# Patient Record
Sex: MALE | Race: WHITE | NOT HISPANIC OR LATINO | Employment: FULL TIME | URBAN - METROPOLITAN AREA
[De-identification: names, ages, dates, MRNs, and addresses within clinical notes are randomized per-mention and may not be internally consistent; named-entity substitution may affect disease eponyms.]

---

## 2021-12-20 ENCOUNTER — HOSPITAL ENCOUNTER (EMERGENCY)
Facility: HOSPITAL | Age: 46
Discharge: HOME | End: 2021-12-20
Attending: STUDENT IN AN ORGANIZED HEALTH CARE EDUCATION/TRAINING PROGRAM | Admitting: STUDENT IN AN ORGANIZED HEALTH CARE EDUCATION/TRAINING PROGRAM
Payer: OTHER MISCELLANEOUS

## 2021-12-20 VITALS
OXYGEN SATURATION: 97 % | DIASTOLIC BLOOD PRESSURE: 77 MMHG | HEIGHT: 77 IN | HEART RATE: 63 BPM | TEMPERATURE: 96.1 F | SYSTOLIC BLOOD PRESSURE: 134 MMHG | RESPIRATION RATE: 12 BRPM | BODY MASS INDEX: 27.75 KG/M2 | WEIGHT: 235 LBS

## 2021-12-20 DIAGNOSIS — S61.012A LACERATION OF LEFT THUMB WITHOUT FOREIGN BODY WITHOUT DAMAGE TO NAIL, INITIAL ENCOUNTER: ICD-10-CM

## 2021-12-20 PROCEDURE — 12001 RPR S/N/AX/GEN/TRNK 2.5CM/<: CPT

## 2021-12-20 PROCEDURE — 99283 EMERGENCY DEPT VISIT LOW MDM: CPT

## 2021-12-20 ASSESSMENT — MIFFLIN-ST. JEOR: SCORE: 2053.33

## 2021-12-20 NOTE — ED TRIAGE NOTES
Pt cut his left thumb with a utility knife today at 1130 am while cutting carpet. Pt reports a long laceration to his left thumb. Pts last tetanus was 10 yrs ago.

## 2021-12-20 NOTE — ED PROVIDER NOTES
"ED Provider In Triage Note  [facility] [place]  Encounter Date: Dec 20, 2021    Chief Complaint   Patient presents with     Laceration       Brief HPI:   Caden Melgoza is a 46 year old male presenting to the Emergency Department with a chief complaint of was cutting carpet with a utility knife, slipped, cut Left thumb.     Happened about 11:30 am today. Was cutting some carpet.   Has 2.5cm lac to the extensor surface of Left thumb along the prox phalanx    Brief Physical Exam:  /77   Pulse 63   Temp (!) 96.1  F (35.6  C) (Tympanic)   Resp 12   Ht 1.956 m (6' 5\")   Wt 106.6 kg (235 lb)   SpO2 97%   BMI 27.87 kg/m    General: Non-toxic appearing  HEENT: Atraumatic  Resp: No respiratory distress  Abdomen: Non-peritoneal  Skin: 2.5 cm lac to left thumb  Neuro: Alert, oriented, answers questions appropriately  Psych: Behavior appropriate    Plan Initiated in Triage:  TDap, lac repair    PIT Dispo:   Return to lobby while awaiting workup and ED bed availability    Cooper Alvarado MD on 12/20/2021 at 1:00 PM    Patient was evaluated by the Physician in Triage due to a limitation of available rooms in the Emergency Department. A plan of care was discussed based on the information obtained on the initial evaluation and patient was consuled to return back to the Emergency Department lobby after this initial evalutaiton until results were obtained or a room became available in the Emergency Department. Patient was counseled not to leave prior to receiving the results of their workup.      Cooper Alvarado MD  12/20/21 1307    "

## 2021-12-20 NOTE — ED PROVIDER NOTES
EMERGENCY DEPARTMENT ENCOUNTER       ED Course & Medical Decision Making     Final Impression  46 year old male presents for evaluation of laceration to the left thumb.  Was cutting some carpet at work with utility knife at about 11:30 AM today, accidentally cut the extensor portion of his left thumb along the proximal phalanx.  No obvious tendon damage, no tendon deficit or strength deficits on exam.  Laceration repaired as documented below.  Will need to return for suture removal in about 10 days.  Last tetanus shot was about 10 years ago, will order tetanus update.    Prior to making a final disposition on this patient the results of patient's tests and other diagnostic studies were discussed with the patient. All questions were answered. Patient expressed understanding of the plan and was amenable to it.    Medications   Tdap (tetanus-diphtheria-acell pertussis) (ADACEL) injection 0.5 mL (has no administration in time range)       Final Impression     1. Laceration of left thumb without foreign body without damage to nail, initial encounter        Procedures     PROCEDURE: Laceration Repair   INDICATIONS: Laceration   PROCEDURE PROVIDER: Dr Cooper Alvarado   SITE: Left thumb, prox phalanx   TYPE/SIZE: subcutaneous, clean and no foreign body visualized  2.5 cm (total length)   FUNCTIONAL ASSESSMENT: Distal sensation, circulation, motor and tendon function intact   MEDICATION: 6 mLs of 2% Lidocaine with epinephrine   PREPARATION: irrigation with Normal saline   DEBRIDEMENT: no debridement   CLOSURE:  Wound was closed in   one layer: Skin closed with 7 stitches of 4-0 Ethilon    Total number of sutures/staples placed: 7     Chief Complaint     Chief Complaint   Patient presents with     Laceration     Pt cut his left thumb with a utility knife today at 1130 am while cutting carpet. Pt reports a long laceration to his left thumb. Pts last tetanus was 10 yrs ago.      HPI     Caden Melgoza is a 46 year old male who  "presents for evaluation of laceration to his left thumb.  Occurred just prior to arrival while cutting some carpet at work.  Patient is right-handed, denies any weakness or deficit in his left thumb, able to fully extend and flex, reports normal sensation in the fingertip.    Past Medical History     Denies relevant PMH  Denies relevant PSH  Denies relevant family history   Social History     Tobacco Use     Smoking status: Not on file     Smokeless tobacco: Not on file   Substance Use Topics     Alcohol use: Not on file     Drug use: Not on file     No Known Allergies    Relevant past medical, surgical, family and social history as documented above, has been reviewed and discussed with patient. No changes or additions, unless otherwise noted in the HPI.    Current Medications     No current outpatient medications on file.    Review of Systems     Musculoskeletal: Endorses laceration to the back of left thumb. Denies weakness or sensory changes in the thumb    Remainder of systems reviewed, unless noted in HPI all others negative.    Physical Exam     /77   Pulse 63   Temp (!) 96.1  F (35.6  C) (Tympanic)   Resp 12   Ht 1.956 m (6' 5\")   Wt 106.6 kg (235 lb)   SpO2 97%   BMI 27.87 kg/m    Constitutional: Awake, alert, in no acute distress  Head: Normocephalic, atraumatic.  ENT: Mucous membranes moist.   Eyes: PERRL,  Conjunctiva normal  Respiratory: Respirations even, unlabored.   Cardiovascular: Regular rate and rhythm.   Musculoskeletal: Moves all 4 extremities equally, strength symmetrical on bilateral uppers and lowers. 2.5cm laceration to the extensor aspect of left thumb, across the proximal phalanx. Able to flex and extend the thumb without deficit or limitation. Endorses normal sensation in the tip. No arterial bleeding or spurting of blood.   Neurologic: Alert & oriented x 3. Normal speech. Grossly normal motor and sensory function. No focal deficits noted.  Psychiatric: Normal mood and affect. " Normal judgement.         Cooper Alvarado MD  12/20/21 9363

## 2022-12-28 ENCOUNTER — APPOINTMENT (OUTPATIENT)
Dept: RADIOLOGY | Facility: HOSPITAL | Age: 47
End: 2022-12-28
Attending: EMERGENCY MEDICINE
Payer: COMMERCIAL

## 2022-12-28 ENCOUNTER — HOSPITAL ENCOUNTER (EMERGENCY)
Facility: HOSPITAL | Age: 47
Discharge: HOME | End: 2022-12-29
Attending: EMERGENCY MEDICINE | Admitting: EMERGENCY MEDICINE
Payer: COMMERCIAL

## 2022-12-28 VITALS
RESPIRATION RATE: 18 BRPM | SYSTOLIC BLOOD PRESSURE: 134 MMHG | TEMPERATURE: 99.7 F | WEIGHT: 250 LBS | OXYGEN SATURATION: 96 % | HEIGHT: 77 IN | HEART RATE: 65 BPM | DIASTOLIC BLOOD PRESSURE: 68 MMHG | BODY MASS INDEX: 29.52 KG/M2

## 2022-12-28 DIAGNOSIS — S89.92XA INJURY OF LEFT KNEE, INITIAL ENCOUNTER: ICD-10-CM

## 2022-12-28 PROCEDURE — 29505 APPLICATION LONG LEG SPLINT: CPT | Mod: LT

## 2022-12-28 PROCEDURE — 90715 TDAP VACCINE 7 YRS/> IM: CPT | Performed by: EMERGENCY MEDICINE

## 2022-12-28 PROCEDURE — 250N000013 HC RX MED GY IP 250 OP 250 PS 637: Performed by: EMERGENCY MEDICINE

## 2022-12-28 PROCEDURE — 99284 EMERGENCY DEPT VISIT MOD MDM: CPT | Mod: 25

## 2022-12-28 PROCEDURE — 73630 X-RAY EXAM OF FOOT: CPT | Mod: LT

## 2022-12-28 PROCEDURE — 250N000011 HC RX IP 250 OP 636: Performed by: EMERGENCY MEDICINE

## 2022-12-28 PROCEDURE — 90471 IMMUNIZATION ADMIN: CPT | Performed by: EMERGENCY MEDICINE

## 2022-12-28 PROCEDURE — 73560 X-RAY EXAM OF KNEE 1 OR 2: CPT | Mod: LT

## 2022-12-28 RX ORDER — IBUPROFEN 800 MG/1
800 TABLET, FILM COATED ORAL EVERY 8 HOURS PRN
Qty: 60 TABLET | Refills: 0 | Status: SHIPPED | OUTPATIENT
Start: 2022-12-28 | End: 2023-01-05

## 2022-12-28 RX ORDER — OXYCODONE HYDROCHLORIDE 5 MG/1
5 TABLET ORAL ONCE
Status: COMPLETED | OUTPATIENT
Start: 2022-12-29 | End: 2022-12-28

## 2022-12-28 RX ORDER — OXYCODONE HYDROCHLORIDE 5 MG/1
5 TABLET ORAL EVERY 6 HOURS PRN
Qty: 12 TABLET | Refills: 0 | Status: SHIPPED | OUTPATIENT
Start: 2022-12-28 | End: 2022-12-31

## 2022-12-28 RX ADMIN — CLOSTRIDIUM TETANI TOXOID ANTIGEN (FORMALDEHYDE INACTIVATED), CORYNEBACTERIUM DIPHTHERIAE TOXOID ANTIGEN (FORMALDEHYDE INACTIVATED), BORDETELLA PERTUSSIS TOXOID ANTIGEN (GLUTARALDEHYDE INACTIVATED), BORDETELLA PERTUSSIS FILAMENTOUS HEMAGGLUTININ ANTIGEN (FORMALDEHYDE INACTIVATED), BORDETELLA PERTUSSIS PERTACTIN ANTIGEN, AND BORDETELLA PERTUSSIS FIMBRIAE 2/3 ANTIGEN 0.5 ML: 5; 2; 2.5; 5; 3; 5 INJECTION, SUSPENSION INTRAMUSCULAR at 23:56

## 2022-12-28 RX ADMIN — OXYCODONE HYDROCHLORIDE 5 MG: 5 TABLET ORAL at 23:50

## 2022-12-28 ASSESSMENT — ENCOUNTER SYMPTOMS
HEADACHES: 0
WOUND: 1
NECK STIFFNESS: 0
ABDOMINAL PAIN: 0
CONFUSION: 0
EYE REDNESS: 0
COLOR CHANGE: 0
FEVER: 0
ARTHRALGIAS: 0
DIFFICULTY URINATING: 0
SHORTNESS OF BREATH: 0

## 2022-12-28 ASSESSMENT — ACTIVITIES OF DAILY LIVING (ADL): ADLS_ACUITY_SCORE: 35

## 2022-12-29 NOTE — DISCHARGE INSTRUCTIONS
As we discussed, with the popping noise and the location of pain it is possible that you have an injury to your medial meniscus.  It is good that your x-ray is negative for fracture today but as we discussed use the knee immobilizer and crutches to not put any weight on your leg.  First thing tomorrow, call the number above to schedule an appointment with orthopedics where they will likely do an MRI if this continues.  In the meantime, use the medications prescribed to help with discomfort, keep it elevated up on a pillow to help with swelling, and use ice frequently.

## 2022-12-29 NOTE — ED TRIAGE NOTES
Pt arriving via EMS from home, slipped down the step in his garage. Lacerations on top of left foot. Left knee is swollen. 100mcg of fentanyl given in route. Denies hitting head.

## 2022-12-29 NOTE — ED PROVIDER NOTES
EMERGENCY DEPARTMENT ENCOUNTER     NAME: Caden Melgoza   AGE: 47 year old male   YOB: 1975   MRN: [id number]   EVALUATION DATE & TIME: 12/28/2022 10:12 PM   PCP: Ferny Vega     Chief Complaint   Patient presents with     Fall   :    FINAL IMPRESSION       1. Injury of left knee, initial encounter           ED COURSE & MEDICAL DECISION MAKING      Pertinent Labs & Imaging studies reviewed. (See chart for details)   10:20 PM 47 year old male  presents to the Emergency Department for evaluation of a mechanical fall with left knee pain. Initial Vitals Reviewed. Initial exam notable for generally well-appearing male who has no laxity or deformity of the knee but who has tenderness over the inferior medial joint line.  He does also have what appears to be gout of his left first great toe which he states he was previously diagnosed with and a small abrasion overlying drip.  I did consider something like a tibial plateau fracture and did an x-ray which is negative.  I am also suspicious with the popping noise that he describes and the location of something like a medial meniscus injury.  I treated with some additional oral pain medication here, updated his tetanus as the last one was in 2011, and also did an x-ray of the foot to rule out a concurrent injury which was fortunately negative.  At this time I will immobilize in a knee immobilizer and crutches and make him nonweightbearing and have him follow-up with orthopedics for further imaging.  He is comfortable with this plan at time of discharge.  Oxycodone prescribed for breakthrough pain if Tylenol and ibuprofen are not sufficient.           At the conclusion of the encounter I discussed the results of all of the tests and the disposition. The questions were answered. The patient or family acknowledged understanding and was agreeable with the care plan.     0 minutes critical care time, see procedure note below for details if  "relevant    MEDICATIONS GIVEN IN THE EMERGENCY:   Medications   Tdap (tetanus-diphtheria-acell pertussis) (ADACEL) injection 0.5 mL (has no administration in time range)      NEW PRESCRIPTIONS STARTED AT TODAY'S ER VISIT   New Prescriptions    No medications on file     ================================================================   HISTORY OF PRESENT ILLNESS       Patient information was obtained from: patient   Use of Intrepreter: N/A    Caden Melgoza is a 47 year old male with history of arthritis, gout, and obstructive sleep apena who presents to the emergency department via EMS for evaluation of fall.    Patient was going down the steps in his garage when he slipped down the steps and banged his left knee on the ground (only has 2 steps of stairs). He sustained a laceration/abraision on the top of his left foot and left knee pain and swelling. Wife noted that his left foot \"turned purple\", which prompted them to call EMS en route to the ED. Currently, the knee and foot is wrapped with towels and gauze pads. He has a history of gout, which he endorses his gout is flaring up. Denies any other symptoms. His last tdap was in 2011.     ================================================================    REVIEW OF SYSTEMS       Review of Systems   Constitutional: Negative for fever.   HENT: Negative for congestion.    Eyes: Negative for redness.   Respiratory: Negative for shortness of breath.    Cardiovascular: Negative for chest pain.   Gastrointestinal: Negative for abdominal pain.   Genitourinary: Negative for difficulty urinating.   Musculoskeletal: Negative for arthralgias and neck stiffness.        Positive for left knee pain and swelling   Skin: Positive for wound (laceration/abrasion at the top of left foot). Negative for color change.   Neurological: Negative for headaches.   Psychiatric/Behavioral: Negative for confusion.   All other systems reviewed and are negative.        PAST HISTORY     PAST " "MEDICAL HISTORY:   History reviewed. No pertinent past medical history.   PAST SURGICAL HISTORY:   History reviewed. No pertinent surgical history.   CURRENT MEDICATIONS:   No current outpatient medications on file.    ALLERGIES:   No Known Allergies   FAMILY HISTORY:   No family history on file.   SOCIAL HISTORY:   Social History     Socioeconomic History     Marital status:         VITALS  Patient Vitals for the past 24 hrs:   BP Temp Temp src Pulse Resp SpO2 Height Weight   12/28/22 2219 (!) 153/78 99.7  F (37.6  C) Oral 69 18 95 % 1.956 m (6' 5\") 113.4 kg (250 lb)        ================================================================    PHYSICAL EXAM     VITAL SIGNS: BP (!) 153/78   Pulse 69   Temp 99.7  F (37.6  C) (Oral)   Resp 18   Ht 1.956 m (6' 5\")   Wt 113.4 kg (250 lb)   SpO2 95%   BMI 29.65 kg/m     Constitutional:  Awake, no acute distress   HENT:  Atraumatic, oropharynx without exudate or erythema, membranes moist  Lymph:  No adenopathy  Eyes: EOM intact, PERRL, no injection  Neck: Supple  Respiratory:  Clear to auscultation bilaterally, no wheezes or crackles   Cardiovascular:  Regular rate and rhythm, single S1 and S2   GI:  Soft, nontender, nondistended, no rebound or guarding   Musculoskeletal:  Moves all extremities, no lower extremity edema, no deformities    Skin:  Warm, dry  Neurologic:  Alert and oriented x3, no focal deficits noted       ================================================================  LAB       All pertinent labs reviewed and interpreted.   Labs Ordered and Resulted from Time of ED Arrival to Time of ED Departure - No data to display     ===============================================================  RADIOLOGY       Reviewed all pertinent imaging. Please see official radiology report.   Foot XR, G/E 3 views, left   Final Result   IMPRESSION: No fracture or dislocation. Moderate degenerative changes left first MTP joint. Mild soft tissue swelling in the left " first MTP joint.      XR Knee Left 1/2 Views   Final Result   IMPRESSION:       No evidence of acute fracture or dislocation.      Mild tricompartment osteoarthrosis of the knee. No significant knee joint effusion.      Mild prepatellar soft tissue swelling.            ================================================================  EKG         I have independently reviewed and interpreted the EKG(s) documented above.     ================================================================  PROCEDURES         I, Allie Lylesriley GallagherBeth, am serving as a scribe to document services personally performed by Dr. Herrera based on my observation and the provider's statements to me. I, Dana Herrera MD attest that Allie Campos is acting in a scribe capacity, has observed my performance of the services and has documented them in accordance with my direction.   Dana Herrera M.D.   Emergency Medicine   [facility] EMERGENCY DEPARTMENT  [address] [place] [id number]  [phone]  Dept: [phone]        Dana Herrera MD  12/28/22 5581

## 2022-12-29 NOTE — ED NOTES
Bed: JNED-35  Expected date: 12/28/22  Expected time: 10:03 PM  Means of arrival: Ambulance  Comments:  47M Juan Segal

## 2023-10-18 ENCOUNTER — HOSPITAL ENCOUNTER (INPATIENT)
Facility: HOSPITAL | Age: 48
LOS: 4 days | Discharge: HOME | DRG: 513 | End: 2023-10-22
Attending: EMERGENCY MEDICINE | Admitting: INTERNAL MEDICINE
Payer: OTHER MISCELLANEOUS

## 2023-10-18 ENCOUNTER — APPOINTMENT (OUTPATIENT)
Dept: RADIOLOGY | Facility: HOSPITAL | Age: 48
DRG: 513 | End: 2023-10-18
Attending: EMERGENCY MEDICINE
Payer: OTHER MISCELLANEOUS

## 2023-10-18 ENCOUNTER — ANESTHESIA EVENT (OUTPATIENT)
Dept: SURGERY | Facility: HOSPITAL | Age: 48
DRG: 513 | End: 2023-10-18
Payer: OTHER MISCELLANEOUS

## 2023-10-18 ENCOUNTER — ANESTHESIA (OUTPATIENT)
Dept: SURGERY | Facility: HOSPITAL | Age: 48
DRG: 513 | End: 2023-10-18
Payer: OTHER MISCELLANEOUS

## 2023-10-18 DIAGNOSIS — M00.042: Primary | ICD-10-CM

## 2023-10-18 DIAGNOSIS — S61.412A LACERATION OF LEFT HAND WITH INFECTION, INITIAL ENCOUNTER: ICD-10-CM

## 2023-10-18 DIAGNOSIS — L08.9 LACERATION OF LEFT HAND WITH INFECTION, INITIAL ENCOUNTER: ICD-10-CM

## 2023-10-18 PROBLEM — G47.33 OSA (OBSTRUCTIVE SLEEP APNEA): Status: ACTIVE | Noted: 2023-10-18

## 2023-10-18 PROBLEM — S61.402A EXTENSOR TENDON LACERATION OF LEFT HAND WITH OPEN WOUND: Status: ACTIVE | Noted: 2023-10-18

## 2023-10-18 PROBLEM — K21.00 GASTROESOPHAGEAL REFLUX DISEASE WITH ESOPHAGITIS: Status: ACTIVE | Noted: 2023-10-18

## 2023-10-18 PROBLEM — S66.822A EXTENSOR TENDON LACERATION OF LEFT HAND WITH OPEN WOUND: Status: ACTIVE | Noted: 2023-10-18

## 2023-10-18 PROBLEM — L03.012 CELLULITIS OF FINGER OF LEFT HAND: Status: ACTIVE | Noted: 2023-10-18

## 2023-10-18 PROBLEM — M10.9 GOUT: Status: ACTIVE | Noted: 2023-10-18

## 2023-10-18 LAB
ALBUMIN SERPL BCG-MCNC: 4.4 G/DL (ref 3.5–5.2)
ALP SERPL-CCNC: 93 U/L (ref 40–129)
ALT SERPL W P-5'-P-CCNC: 19 U/L (ref 0–70)
ANION GAP SERPL CALCULATED.3IONS-SCNC: 11 MMOL/L (ref 7–15)
ANION GAP SERPL CALCULATED.3IONS-SCNC: 11 MMOL/L (ref 7–15)
AST SERPL W P-5'-P-CCNC: 19 U/L (ref 0–45)
BASO+EOS+MONOS # BLD AUTO: ABNORMAL 10*3/UL
BASO+EOS+MONOS # BLD AUTO: ABNORMAL 10*3/UL
BASO+EOS+MONOS NFR BLD AUTO: ABNORMAL %
BASO+EOS+MONOS NFR BLD AUTO: ABNORMAL %
BASOPHILS # BLD AUTO: 0 10E3/UL (ref 0–0.2)
BASOPHILS # BLD AUTO: 0.1 10E3/UL (ref 0–0.2)
BASOPHILS NFR BLD AUTO: 0 %
BASOPHILS NFR BLD AUTO: 0 %
BILIRUB SERPL-MCNC: 0.4 MG/DL
BUN SERPL-MCNC: 16.4 MG/DL (ref 6–20)
BUN SERPL-MCNC: 18 MG/DL (ref 6–20)
CALCIUM SERPL-MCNC: 9.2 MG/DL (ref 8.6–10)
CALCIUM SERPL-MCNC: 9.3 MG/DL (ref 8.6–10)
CHLORIDE SERPL-SCNC: 100 MMOL/L (ref 98–107)
CHLORIDE SERPL-SCNC: 105 MMOL/L (ref 98–107)
CREAT SERPL-MCNC: 0.78 MG/DL (ref 0.67–1.17)
CREAT SERPL-MCNC: 0.86 MG/DL (ref 0.67–1.17)
CRP SERPL-MCNC: 56.4 MG/L
DEPRECATED HCO3 PLAS-SCNC: 22 MMOL/L (ref 22–29)
DEPRECATED HCO3 PLAS-SCNC: 24 MMOL/L (ref 22–29)
EGFRCR SERPLBLD CKD-EPI 2021: >90 ML/MIN/1.73M2
EGFRCR SERPLBLD CKD-EPI 2021: >90 ML/MIN/1.73M2
EOSINOPHIL # BLD AUTO: 0 10E3/UL (ref 0–0.7)
EOSINOPHIL # BLD AUTO: 0.4 10E3/UL (ref 0–0.7)
EOSINOPHIL NFR BLD AUTO: 0 %
EOSINOPHIL NFR BLD AUTO: 3 %
ERYTHROCYTE [DISTWIDTH] IN BLOOD BY AUTOMATED COUNT: 12.5 % (ref 10–15)
ERYTHROCYTE [DISTWIDTH] IN BLOOD BY AUTOMATED COUNT: 12.6 % (ref 10–15)
GLUCOSE SERPL-MCNC: 118 MG/DL (ref 70–99)
GLUCOSE SERPL-MCNC: 158 MG/DL (ref 70–99)
HCT VFR BLD AUTO: 38.6 % (ref 40–53)
HCT VFR BLD AUTO: 41.1 % (ref 40–53)
HGB BLD-MCNC: 12.7 G/DL (ref 13.3–17.7)
HGB BLD-MCNC: 13.9 G/DL (ref 13.3–17.7)
IMM GRANULOCYTES # BLD: 0 10E3/UL
IMM GRANULOCYTES # BLD: 0.1 10E3/UL
IMM GRANULOCYTES NFR BLD: 0 %
IMM GRANULOCYTES NFR BLD: 0 %
LACTATE SERPL-SCNC: 0.9 MMOL/L (ref 0.7–2)
LYMPHOCYTES # BLD AUTO: 1.1 10E3/UL (ref 0.8–5.3)
LYMPHOCYTES # BLD AUTO: 2.3 10E3/UL (ref 0.8–5.3)
LYMPHOCYTES NFR BLD AUTO: 18 %
LYMPHOCYTES NFR BLD AUTO: 9 %
MCH RBC QN AUTO: 30.2 PG (ref 26.5–33)
MCH RBC QN AUTO: 30.5 PG (ref 26.5–33)
MCHC RBC AUTO-ENTMCNC: 32.9 G/DL (ref 31.5–36.5)
MCHC RBC AUTO-ENTMCNC: 33.8 G/DL (ref 31.5–36.5)
MCV RBC AUTO: 90 FL (ref 78–100)
MCV RBC AUTO: 92 FL (ref 78–100)
MONOCYTES # BLD AUTO: 0.6 10E3/UL (ref 0–1.3)
MONOCYTES # BLD AUTO: 1 10E3/UL (ref 0–1.3)
MONOCYTES NFR BLD AUTO: 5 %
MONOCYTES NFR BLD AUTO: 8 %
MRSA DNA SPEC QL NAA+PROBE: NEGATIVE
NEUTROPHILS # BLD AUTO: 9 10E3/UL (ref 1.6–8.3)
NEUTROPHILS # BLD AUTO: 9.7 10E3/UL (ref 1.6–8.3)
NEUTROPHILS NFR BLD AUTO: 71 %
NEUTROPHILS NFR BLD AUTO: 86 %
NRBC # BLD AUTO: 0 10E3/UL
NRBC # BLD AUTO: 0 10E3/UL
NRBC BLD AUTO-RTO: 0 /100
NRBC BLD AUTO-RTO: 0 /100
PLATELET # BLD AUTO: 222 10E3/UL (ref 150–450)
PLATELET # BLD AUTO: 250 10E3/UL (ref 150–450)
POTASSIUM SERPL-SCNC: 4.3 MMOL/L (ref 3.4–5.3)
POTASSIUM SERPL-SCNC: 4.4 MMOL/L (ref 3.4–5.3)
PROT SERPL-MCNC: 6.8 G/DL (ref 6.4–8.3)
RBC # BLD AUTO: 4.21 10E6/UL (ref 4.4–5.9)
RBC # BLD AUTO: 4.56 10E6/UL (ref 4.4–5.9)
SA TARGET DNA: POSITIVE
SODIUM SERPL-SCNC: 135 MMOL/L (ref 135–145)
SODIUM SERPL-SCNC: 138 MMOL/L (ref 135–145)
WBC # BLD AUTO: 11.5 10E3/UL (ref 4–11)
WBC # BLD AUTO: 12.8 10E3/UL (ref 4–11)

## 2023-10-18 PROCEDURE — 90471 IMMUNIZATION ADMIN: CPT | Performed by: INTERNAL MEDICINE

## 2023-10-18 PROCEDURE — 87101 SKIN FUNGI CULTURE: CPT | Performed by: ORTHOPAEDIC SURGERY

## 2023-10-18 PROCEDURE — 258N000003 HC RX IP 258 OP 636: Performed by: EMERGENCY MEDICINE

## 2023-10-18 PROCEDURE — 258N000003 HC RX IP 258 OP 636: Performed by: PHYSICIAN ASSISTANT

## 2023-10-18 PROCEDURE — 999N000141 HC STATISTIC PRE-PROCEDURE NURSING ASSESSMENT: Performed by: ORTHOPAEDIC SURGERY

## 2023-10-18 PROCEDURE — 87147 CULTURE TYPE IMMUNOLOGIC: CPT | Performed by: ORTHOPAEDIC SURGERY

## 2023-10-18 PROCEDURE — 99418 PROLNG IP/OBS E/M EA 15 MIN: CPT | Performed by: INTERNAL MEDICINE

## 2023-10-18 PROCEDURE — 90715 TDAP VACCINE 7 YRS/> IM: CPT | Performed by: INTERNAL MEDICINE

## 2023-10-18 PROCEDURE — 250N000013 HC RX MED GY IP 250 OP 250 PS 637: Performed by: INTERNAL MEDICINE

## 2023-10-18 PROCEDURE — 86140 C-REACTIVE PROTEIN: CPT | Performed by: EMERGENCY MEDICINE

## 2023-10-18 PROCEDURE — 250N000011 HC RX IP 250 OP 636: Performed by: INTERNAL MEDICINE

## 2023-10-18 PROCEDURE — 250N000013 HC RX MED GY IP 250 OP 250 PS 637: Performed by: PHYSICIAN ASSISTANT

## 2023-10-18 PROCEDURE — 250N000011 HC RX IP 250 OP 636: Performed by: EMERGENCY MEDICINE

## 2023-10-18 PROCEDURE — 258N000003 HC RX IP 258 OP 636: Performed by: ANESTHESIOLOGY

## 2023-10-18 PROCEDURE — 250N000011 HC RX IP 250 OP 636: Mod: JZ | Performed by: PHYSICIAN ASSISTANT

## 2023-10-18 PROCEDURE — 36415 COLL VENOUS BLD VENIPUNCTURE: CPT | Performed by: INTERNAL MEDICINE

## 2023-10-18 PROCEDURE — 96365 THER/PROPH/DIAG IV INF INIT: CPT

## 2023-10-18 PROCEDURE — 96376 TX/PRO/DX INJ SAME DRUG ADON: CPT

## 2023-10-18 PROCEDURE — 99223 1ST HOSP IP/OBS HIGH 75: CPT | Mod: FS | Performed by: INTERNAL MEDICINE

## 2023-10-18 PROCEDURE — 250N000011 HC RX IP 250 OP 636: Mod: JZ | Performed by: INTERNAL MEDICINE

## 2023-10-18 PROCEDURE — 99285 EMERGENCY DEPT VISIT HI MDM: CPT | Mod: 25

## 2023-10-18 PROCEDURE — 99207 PR APP CREDIT; MD BILLING SHARED VISIT: CPT | Performed by: INTERNAL MEDICINE

## 2023-10-18 PROCEDURE — 87040 BLOOD CULTURE FOR BACTERIA: CPT | Performed by: EMERGENCY MEDICINE

## 2023-10-18 PROCEDURE — 82040 ASSAY OF SERUM ALBUMIN: CPT | Performed by: INTERNAL MEDICINE

## 2023-10-18 PROCEDURE — 250N000009 HC RX 250: Performed by: ORTHOPAEDIC SURGERY

## 2023-10-18 PROCEDURE — 85025 COMPLETE CBC W/AUTO DIFF WBC: CPT | Performed by: INTERNAL MEDICINE

## 2023-10-18 PROCEDURE — 250N000011 HC RX IP 250 OP 636: Mod: JZ | Performed by: NURSE ANESTHETIST, CERTIFIED REGISTERED

## 2023-10-18 PROCEDURE — 250N000011 HC RX IP 250 OP 636: Performed by: NURSE ANESTHETIST, CERTIFIED REGISTERED

## 2023-10-18 PROCEDURE — 87641 MR-STAPH DNA AMP PROBE: CPT | Performed by: INTERNAL MEDICINE

## 2023-10-18 PROCEDURE — 360N000075 HC SURGERY LEVEL 2, PER MIN: Performed by: ORTHOPAEDIC SURGERY

## 2023-10-18 PROCEDURE — 36415 COLL VENOUS BLD VENIPUNCTURE: CPT | Performed by: EMERGENCY MEDICINE

## 2023-10-18 PROCEDURE — 370N000017 HC ANESTHESIA TECHNICAL FEE, PER MIN: Performed by: ORTHOPAEDIC SURGERY

## 2023-10-18 PROCEDURE — 258N000003 HC RX IP 258 OP 636: Performed by: INTERNAL MEDICINE

## 2023-10-18 PROCEDURE — 87205 SMEAR GRAM STAIN: CPT | Performed by: ORTHOPAEDIC SURGERY

## 2023-10-18 PROCEDURE — 272N000001 HC OR GENERAL SUPPLY STERILE: Performed by: ORTHOPAEDIC SURGERY

## 2023-10-18 PROCEDURE — 85025 COMPLETE CBC W/AUTO DIFF WBC: CPT | Performed by: EMERGENCY MEDICINE

## 2023-10-18 PROCEDURE — 120N000001 HC R&B MED SURG/OB

## 2023-10-18 PROCEDURE — 250N000013 HC RX MED GY IP 250 OP 250 PS 637: Performed by: EMERGENCY MEDICINE

## 2023-10-18 PROCEDURE — 0LD80ZZ EXTRACTION OF LEFT HAND TENDON, OPEN APPROACH: ICD-10-PCS | Performed by: ORTHOPAEDIC SURGERY

## 2023-10-18 PROCEDURE — 96368 THER/DIAG CONCURRENT INF: CPT

## 2023-10-18 PROCEDURE — 73140 X-RAY EXAM OF FINGER(S): CPT | Mod: LT

## 2023-10-18 PROCEDURE — 80053 COMPREHEN METABOLIC PANEL: CPT | Performed by: EMERGENCY MEDICINE

## 2023-10-18 PROCEDURE — 87077 CULTURE AEROBIC IDENTIFY: CPT | Performed by: ORTHOPAEDIC SURGERY

## 2023-10-18 PROCEDURE — 87075 CULTR BACTERIA EXCEPT BLOOD: CPT | Performed by: ORTHOPAEDIC SURGERY

## 2023-10-18 PROCEDURE — 83605 ASSAY OF LACTIC ACID: CPT | Performed by: EMERGENCY MEDICINE

## 2023-10-18 RX ORDER — PROCHLORPERAZINE MALEATE 10 MG
10 TABLET ORAL EVERY 6 HOURS PRN
Status: DISCONTINUED | OUTPATIENT
Start: 2023-10-18 | End: 2023-10-22 | Stop reason: HOSPADM

## 2023-10-18 RX ORDER — LIDOCAINE 40 MG/G
CREAM TOPICAL
Status: DISCONTINUED | OUTPATIENT
Start: 2023-10-18 | End: 2023-10-22 | Stop reason: HOSPADM

## 2023-10-18 RX ORDER — NALOXONE HYDROCHLORIDE 0.4 MG/ML
0.4 INJECTION, SOLUTION INTRAMUSCULAR; INTRAVENOUS; SUBCUTANEOUS
Status: DISCONTINUED | OUTPATIENT
Start: 2023-10-18 | End: 2023-10-22 | Stop reason: HOSPADM

## 2023-10-18 RX ORDER — ACETAMINOPHEN 325 MG/1
975 TABLET ORAL EVERY 8 HOURS
Status: COMPLETED | OUTPATIENT
Start: 2023-10-18 | End: 2023-10-21

## 2023-10-18 RX ORDER — POLYETHYLENE GLYCOL 3350 17 G/17G
17 POWDER, FOR SOLUTION ORAL DAILY
Status: DISCONTINUED | OUTPATIENT
Start: 2023-10-19 | End: 2023-10-22 | Stop reason: HOSPADM

## 2023-10-18 RX ORDER — ONDANSETRON 2 MG/ML
INJECTION INTRAMUSCULAR; INTRAVENOUS PRN
Status: DISCONTINUED | OUTPATIENT
Start: 2023-10-18 | End: 2023-10-18

## 2023-10-18 RX ORDER — CEFAZOLIN SODIUM 1 G/50ML
2500 SOLUTION INTRAVENOUS EVERY 12 HOURS
Status: CANCELLED | OUTPATIENT
Start: 2023-10-18

## 2023-10-18 RX ORDER — GINSENG 100 MG
CAPSULE ORAL PRN
Status: DISCONTINUED | OUTPATIENT
Start: 2023-10-18 | End: 2023-10-18 | Stop reason: HOSPADM

## 2023-10-18 RX ORDER — FENTANYL CITRATE 50 UG/ML
25 INJECTION, SOLUTION INTRAMUSCULAR; INTRAVENOUS
Status: DISCONTINUED | OUTPATIENT
Start: 2023-10-18 | End: 2023-10-18 | Stop reason: HOSPADM

## 2023-10-18 RX ORDER — ONDANSETRON 4 MG/1
4 TABLET, ORALLY DISINTEGRATING ORAL EVERY 6 HOURS PRN
Status: DISCONTINUED | OUTPATIENT
Start: 2023-10-18 | End: 2023-10-22 | Stop reason: HOSPADM

## 2023-10-18 RX ORDER — PROPOFOL 10 MG/ML
INJECTION, EMULSION INTRAVENOUS CONTINUOUS PRN
Status: DISCONTINUED | OUTPATIENT
Start: 2023-10-18 | End: 2023-10-18

## 2023-10-18 RX ORDER — ONDANSETRON 2 MG/ML
4 INJECTION INTRAMUSCULAR; INTRAVENOUS EVERY 6 HOURS PRN
Status: DISCONTINUED | OUTPATIENT
Start: 2023-10-18 | End: 2023-10-22 | Stop reason: HOSPADM

## 2023-10-18 RX ORDER — ONDANSETRON 4 MG/1
4 TABLET, ORALLY DISINTEGRATING ORAL EVERY 6 HOURS PRN
Status: DISCONTINUED | OUTPATIENT
Start: 2023-10-18 | End: 2023-10-19

## 2023-10-18 RX ORDER — FENTANYL CITRATE 50 UG/ML
INJECTION, SOLUTION INTRAMUSCULAR; INTRAVENOUS PRN
Status: DISCONTINUED | OUTPATIENT
Start: 2023-10-18 | End: 2023-10-18

## 2023-10-18 RX ORDER — NALOXONE HYDROCHLORIDE 0.4 MG/ML
0.2 INJECTION, SOLUTION INTRAMUSCULAR; INTRAVENOUS; SUBCUTANEOUS
Status: DISCONTINUED | OUTPATIENT
Start: 2023-10-18 | End: 2023-10-22 | Stop reason: HOSPADM

## 2023-10-18 RX ORDER — CEFAZOLIN SODIUM 1 G/50ML
1250 SOLUTION INTRAVENOUS EVERY 12 HOURS
Status: DISCONTINUED | OUTPATIENT
Start: 2023-10-18 | End: 2023-10-19

## 2023-10-18 RX ORDER — PROPOFOL 10 MG/ML
INJECTION, EMULSION INTRAVENOUS PRN
Status: DISCONTINUED | OUTPATIENT
Start: 2023-10-18 | End: 2023-10-18

## 2023-10-18 RX ORDER — OXYCODONE HYDROCHLORIDE 5 MG/1
5 TABLET ORAL EVERY 4 HOURS PRN
Status: DISCONTINUED | OUTPATIENT
Start: 2023-10-18 | End: 2023-10-22 | Stop reason: HOSPADM

## 2023-10-18 RX ORDER — HYDROMORPHONE HYDROCHLORIDE 1 MG/ML
0.5 INJECTION, SOLUTION INTRAMUSCULAR; INTRAVENOUS; SUBCUTANEOUS EVERY 4 HOURS PRN
Status: DISCONTINUED | OUTPATIENT
Start: 2023-10-18 | End: 2023-10-19

## 2023-10-18 RX ORDER — BISACODYL 10 MG
10 SUPPOSITORY, RECTAL RECTAL DAILY PRN
Status: DISCONTINUED | OUTPATIENT
Start: 2023-10-18 | End: 2023-10-22 | Stop reason: HOSPADM

## 2023-10-18 RX ORDER — LORATADINE 10 MG/1
10 TABLET ORAL DAILY
Status: DISCONTINUED | OUTPATIENT
Start: 2023-10-18 | End: 2023-10-22 | Stop reason: HOSPADM

## 2023-10-18 RX ORDER — CEFTRIAXONE 1 G/1
1 INJECTION, POWDER, FOR SOLUTION INTRAMUSCULAR; INTRAVENOUS EVERY 24 HOURS
Status: DISCONTINUED | OUTPATIENT
Start: 2023-10-18 | End: 2023-10-19

## 2023-10-18 RX ORDER — ONDANSETRON 2 MG/ML
4 INJECTION INTRAMUSCULAR; INTRAVENOUS EVERY 30 MIN PRN
Status: DISCONTINUED | OUTPATIENT
Start: 2023-10-18 | End: 2023-10-18 | Stop reason: HOSPADM

## 2023-10-18 RX ORDER — ONDANSETRON 4 MG/1
4 TABLET, ORALLY DISINTEGRATING ORAL EVERY 30 MIN PRN
Status: DISCONTINUED | OUTPATIENT
Start: 2023-10-18 | End: 2023-10-18 | Stop reason: HOSPADM

## 2023-10-18 RX ORDER — CEFAZOLIN SODIUM 1 G/50ML
1250 SOLUTION INTRAVENOUS EVERY 12 HOURS
Status: DISCONTINUED | OUTPATIENT
Start: 2023-10-18 | End: 2023-10-18

## 2023-10-18 RX ORDER — OXYCODONE HYDROCHLORIDE 5 MG/1
10 TABLET ORAL
Status: DISCONTINUED | OUTPATIENT
Start: 2023-10-18 | End: 2023-10-18 | Stop reason: HOSPADM

## 2023-10-18 RX ORDER — PANTOPRAZOLE SODIUM 40 MG/1
40 TABLET, DELAYED RELEASE ORAL
Status: DISCONTINUED | OUTPATIENT
Start: 2023-10-18 | End: 2023-10-22 | Stop reason: HOSPADM

## 2023-10-18 RX ORDER — ACETAMINOPHEN 325 MG/1
975 TABLET ORAL
Status: DISCONTINUED | OUTPATIENT
Start: 2023-10-18 | End: 2023-10-18 | Stop reason: HOSPADM

## 2023-10-18 RX ORDER — OXYCODONE AND ACETAMINOPHEN 5; 325 MG/1; MG/1
1 TABLET ORAL ONCE
Status: COMPLETED | OUTPATIENT
Start: 2023-10-18 | End: 2023-10-18

## 2023-10-18 RX ORDER — PROBENECID AND COLCHICINE .5; 5 MG/1; MG/1
1 TABLET ORAL EVERY MORNING
COMMUNITY
Start: 2023-09-11

## 2023-10-18 RX ORDER — ZINC GLUCONATE 50 MG
50 TABLET ORAL DAILY
Status: DISCONTINUED | OUTPATIENT
Start: 2023-10-19 | End: 2023-10-22 | Stop reason: HOSPADM

## 2023-10-18 RX ORDER — ALLOPURINOL 300 MG/1
150 TABLET ORAL DAILY
COMMUNITY
Start: 2023-09-11

## 2023-10-18 RX ORDER — ACETAMINOPHEN 325 MG/1
650 TABLET ORAL EVERY 4 HOURS PRN
Status: DISCONTINUED | OUTPATIENT
Start: 2023-10-21 | End: 2023-10-22 | Stop reason: HOSPADM

## 2023-10-18 RX ORDER — OXYCODONE HYDROCHLORIDE 5 MG/1
5 TABLET ORAL
Status: DISCONTINUED | OUTPATIENT
Start: 2023-10-18 | End: 2023-10-18 | Stop reason: HOSPADM

## 2023-10-18 RX ORDER — HYDROMORPHONE HCL IN WATER/PF 6 MG/30 ML
0.2 PATIENT CONTROLLED ANALGESIA SYRINGE INTRAVENOUS
Status: DISCONTINUED | OUTPATIENT
Start: 2023-10-18 | End: 2023-10-22 | Stop reason: HOSPADM

## 2023-10-18 RX ORDER — LIDOCAINE 40 MG/G
CREAM TOPICAL
Status: DISCONTINUED | OUTPATIENT
Start: 2023-10-18 | End: 2023-10-18 | Stop reason: HOSPADM

## 2023-10-18 RX ORDER — OMEPRAZOLE 20 MG/1
20 TABLET, DELAYED RELEASE ORAL DAILY
COMMUNITY

## 2023-10-18 RX ORDER — SODIUM CHLORIDE, SODIUM LACTATE, POTASSIUM CHLORIDE, CALCIUM CHLORIDE 600; 310; 30; 20 MG/100ML; MG/100ML; MG/100ML; MG/100ML
INJECTION, SOLUTION INTRAVENOUS CONTINUOUS
Status: DISCONTINUED | OUTPATIENT
Start: 2023-10-18 | End: 2023-10-18 | Stop reason: HOSPADM

## 2023-10-18 RX ORDER — CHLORAL HYDRATE 500 MG
1 CAPSULE ORAL DAILY
COMMUNITY

## 2023-10-18 RX ORDER — AMOXICILLIN 250 MG
1 CAPSULE ORAL 2 TIMES DAILY
Status: DISCONTINUED | OUTPATIENT
Start: 2023-10-18 | End: 2023-10-22 | Stop reason: HOSPADM

## 2023-10-18 RX ORDER — DOCUSATE SODIUM 100 MG/1
100 CAPSULE, LIQUID FILLED ORAL 2 TIMES DAILY
Status: DISCONTINUED | OUTPATIENT
Start: 2023-10-18 | End: 2023-10-22 | Stop reason: HOSPADM

## 2023-10-18 RX ORDER — HYDROCODONE BITARTRATE AND ACETAMINOPHEN 5; 325 MG/1; MG/1
1 TABLET ORAL EVERY 4 HOURS PRN
Status: DISCONTINUED | OUTPATIENT
Start: 2023-10-18 | End: 2023-10-22 | Stop reason: HOSPADM

## 2023-10-18 RX ORDER — SODIUM CHLORIDE, SODIUM LACTATE, POTASSIUM CHLORIDE, CALCIUM CHLORIDE 600; 310; 30; 20 MG/100ML; MG/100ML; MG/100ML; MG/100ML
INJECTION, SOLUTION INTRAVENOUS CONTINUOUS
Status: DISCONTINUED | OUTPATIENT
Start: 2023-10-18 | End: 2023-10-22 | Stop reason: HOSPADM

## 2023-10-18 RX ORDER — CEFTRIAXONE 1 G/1
1 INJECTION, POWDER, FOR SOLUTION INTRAMUSCULAR; INTRAVENOUS ONCE
Status: COMPLETED | OUTPATIENT
Start: 2023-10-18 | End: 2023-10-18

## 2023-10-18 RX ORDER — LORATADINE 10 MG/1
10 TABLET ORAL DAILY
COMMUNITY

## 2023-10-18 RX ORDER — ONDANSETRON 2 MG/ML
4 INJECTION INTRAMUSCULAR; INTRAVENOUS EVERY 6 HOURS PRN
Status: DISCONTINUED | OUTPATIENT
Start: 2023-10-18 | End: 2023-10-19

## 2023-10-18 RX ORDER — OXYCODONE HYDROCHLORIDE 5 MG/1
10 TABLET ORAL EVERY 4 HOURS PRN
Status: DISCONTINUED | OUTPATIENT
Start: 2023-10-18 | End: 2023-10-22 | Stop reason: HOSPADM

## 2023-10-18 RX ORDER — OMEPRAZOLE 20 MG/1
20 TABLET, DELAYED RELEASE ORAL DAILY
Status: DISCONTINUED | OUTPATIENT
Start: 2023-10-18 | End: 2023-10-18

## 2023-10-18 RX ORDER — IBUPROFEN 200 MG
400 TABLET ORAL ONCE
Status: COMPLETED | OUTPATIENT
Start: 2023-10-18 | End: 2023-10-18

## 2023-10-18 RX ORDER — LIDOCAINE HYDROCHLORIDE AND EPINEPHRINE 10; 10 MG/ML; UG/ML
INJECTION, SOLUTION INFILTRATION; PERINEURAL PRN
Status: DISCONTINUED | OUTPATIENT
Start: 2023-10-18 | End: 2023-10-18 | Stop reason: HOSPADM

## 2023-10-18 RX ORDER — PROBENECID AND COLCHICINE .5; 5 MG/1; MG/1
1 TABLET ORAL EVERY MORNING
Status: DISCONTINUED | OUTPATIENT
Start: 2023-10-19 | End: 2023-10-22 | Stop reason: HOSPADM

## 2023-10-18 RX ORDER — CEFAZOLIN SODIUM 2 G/100ML
2 INJECTION, SOLUTION INTRAVENOUS EVERY 8 HOURS
Qty: 200 ML | Refills: 0 | Status: COMPLETED | OUTPATIENT
Start: 2023-10-18 | End: 2023-10-19

## 2023-10-18 RX ORDER — HYDROMORPHONE HCL IN WATER/PF 6 MG/30 ML
0.4 PATIENT CONTROLLED ANALGESIA SYRINGE INTRAVENOUS
Status: DISCONTINUED | OUTPATIENT
Start: 2023-10-18 | End: 2023-10-22 | Stop reason: HOSPADM

## 2023-10-18 RX ADMIN — CEFTRIAXONE SODIUM 1 G: 1 INJECTION, POWDER, FOR SOLUTION INTRAMUSCULAR; INTRAVENOUS at 22:32

## 2023-10-18 RX ADMIN — OXYCODONE HYDROCHLORIDE AND ACETAMINOPHEN 1 TABLET: 5; 325 TABLET ORAL at 03:25

## 2023-10-18 RX ADMIN — FENTANYL CITRATE 50 MCG: 50 INJECTION INTRAMUSCULAR; INTRAVENOUS at 18:23

## 2023-10-18 RX ADMIN — ONDANSETRON 4 MG: 2 INJECTION INTRAMUSCULAR; INTRAVENOUS at 18:43

## 2023-10-18 RX ADMIN — FENTANYL CITRATE 50 MCG: 50 INJECTION INTRAMUSCULAR; INTRAVENOUS at 18:42

## 2023-10-18 RX ADMIN — CLOSTRIDIUM TETANI TOXOID ANTIGEN (FORMALDEHYDE INACTIVATED), CORYNEBACTERIUM DIPHTHERIAE TOXOID ANTIGEN (FORMALDEHYDE INACTIVATED), BORDETELLA PERTUSSIS TOXOID ANTIGEN (GLUTARALDEHYDE INACTIVATED), BORDETELLA PERTUSSIS FILAMENTOUS HEMAGGLUTININ ANTIGEN (FORMALDEHYDE INACTIVATED), BORDETELLA PERTUSSIS PERTACTIN ANTIGEN, AND BORDETELLA PERTUSSIS FIMBRIAE 2/3 ANTIGEN 0.5 ML: 5; 2; 2.5; 5; 3; 5 INJECTION, SUSPENSION INTRAMUSCULAR at 04:49

## 2023-10-18 RX ADMIN — IBUPROFEN 400 MG: 200 TABLET, FILM COATED ORAL at 03:25

## 2023-10-18 RX ADMIN — ACETAMINOPHEN 975 MG: 325 TABLET ORAL at 21:37

## 2023-10-18 RX ADMIN — CEFTRIAXONE SODIUM 1 G: 1 INJECTION, POWDER, FOR SOLUTION INTRAMUSCULAR; INTRAVENOUS at 02:30

## 2023-10-18 RX ADMIN — VANCOMYCIN HYDROCHLORIDE 2250 MG: 5 INJECTION, POWDER, LYOPHILIZED, FOR SOLUTION INTRAVENOUS at 02:09

## 2023-10-18 RX ADMIN — DOCUSATE SODIUM 100 MG: 100 CAPSULE, LIQUID FILLED ORAL at 08:33

## 2023-10-18 RX ADMIN — HYDROCODONE BITARTRATE AND ACETAMINOPHEN 1 TABLET: 5; 325 TABLET ORAL at 08:33

## 2023-10-18 RX ADMIN — SODIUM CHLORIDE, POTASSIUM CHLORIDE, SODIUM LACTATE AND CALCIUM CHLORIDE: 600; 310; 30; 20 INJECTION, SOLUTION INTRAVENOUS at 18:17

## 2023-10-18 RX ADMIN — SODIUM CHLORIDE, POTASSIUM CHLORIDE, SODIUM LACTATE AND CALCIUM CHLORIDE: 600; 310; 30; 20 INJECTION, SOLUTION INTRAVENOUS at 21:41

## 2023-10-18 RX ADMIN — DOCUSATE SODIUM 100 MG: 100 CAPSULE, LIQUID FILLED ORAL at 21:37

## 2023-10-18 RX ADMIN — MIDAZOLAM 1 MG: 1 INJECTION INTRAMUSCULAR; INTRAVENOUS at 18:17

## 2023-10-18 RX ADMIN — VANCOMYCIN HYDROCHLORIDE 1250 MG: 5 INJECTION, POWDER, LYOPHILIZED, FOR SOLUTION INTRAVENOUS at 13:24

## 2023-10-18 RX ADMIN — HYDROCODONE BITARTRATE AND ACETAMINOPHEN 1 TABLET: 5; 325 TABLET ORAL at 15:05

## 2023-10-18 RX ADMIN — MIDAZOLAM 1 MG: 1 INJECTION INTRAMUSCULAR; INTRAVENOUS at 18:23

## 2023-10-18 RX ADMIN — HYDROMORPHONE HYDROCHLORIDE 0.2 MG: 0.2 INJECTION, SOLUTION INTRAMUSCULAR; INTRAVENOUS; SUBCUTANEOUS at 22:57

## 2023-10-18 RX ADMIN — CEFAZOLIN SODIUM 2 G: 2 INJECTION, SOLUTION INTRAVENOUS at 21:41

## 2023-10-18 RX ADMIN — SENNOSIDES AND DOCUSATE SODIUM 1 TABLET: 8.6; 5 TABLET ORAL at 21:37

## 2023-10-18 RX ADMIN — PROPOFOL 200 MCG/KG/MIN: 10 INJECTION, EMULSION INTRAVENOUS at 18:23

## 2023-10-18 RX ADMIN — OXYCODONE HYDROCHLORIDE AND ACETAMINOPHEN 1 TABLET: 5; 325 TABLET ORAL at 01:47

## 2023-10-18 RX ADMIN — PROPOFOL 20 MG: 10 INJECTION, EMULSION INTRAVENOUS at 18:23

## 2023-10-18 ASSESSMENT — ACTIVITIES OF DAILY LIVING (ADL)
ADLS_ACUITY_SCORE: 35
ADLS_ACUITY_SCORE: 18
ADLS_ACUITY_SCORE: 18
ADLS_ACUITY_SCORE: 35
DEPENDENT_IADLS:: INDEPENDENT
ADLS_ACUITY_SCORE: 35

## 2023-10-18 NOTE — PLAN OF CARE
Problem: Adult Inpatient Plan of Care  Goal: Optimal Comfort and Wellbeing  Intervention: Monitor Pain and Promote Comfort  Recent Flowsheet Documentation  Taken 10/18/2023 1300 by Nydia Hogue, RN  Pain Management Interventions: declines  Taken 10/18/2023 0833 by Nydia Hogue, RN  Pain Management Interventions: medication (see MAR)     Problem: Infection  Goal: Absence of Infection Signs and Symptoms  Outcome: Progressing   Goal Outcome Evaluation:       Patient receiving IV antibiotics. Pain well controlled with hydrocodone. VSS. Resting comfortably between cares. Waiting to have surgery later this evening.    Nydia Hogue, RN

## 2023-10-18 NOTE — H&P
"[facility]    History and Physical - Hospitalist Service       Date of Admission:  10/18/2023    Assessment & Plan    Patient is a 48-year-old male with a medical history significant for gout, GERD, obstructive sleep apnea and other medical comorbidities including a recent history of a recent history of left third digit laceration who is admitted with infection of the left third digit and hand as well as MTP joint infection.         Patient Active Problem List   Diagnosis    Laceration of left hand with infection, initial encounter    Gastroesophageal reflux disease with esophagitis    Gout    ELIUD (obstructive sleep apnea)    Extensor tendon laceration of left hand with open wound    Cellulitis of finger of left hand      Left hand infection-status post laceration a week ago.  Patient tried to push it up with superglue which made the infection worse.  He is NPO.  Orthopedic surgeon has been consulted.  Continue ceftriaxone and vancomycin for now.  Consider infectious disease input in the near future.  A.m. team to follow    Left hand laceration-patient was out of date with tetanus.  Tdap ordered.  Discussed wearing protective gloves with patient.    Obstructive sleep apnea pulse oximetry as needed-    Gout-pain control as needed    Rest of patient's medical problems management remains unchanged  Diet:  N.p.o.    DVT Prophylaxis: SCD  Lyman Catheter: Not present  Lines: None     Cardiac Monitoring: None  Code Status:  Full code    Clinically Significant Risk Factors Present on Admission                       # Overweight: Estimated body mass index is 28.46 kg/m  as calculated from the following:    Height as of this encounter: 1.956 m (6' 5\").    Weight as of this encounter: 108.9 kg (240 lb).              Disposition Plan           Lakisha Jacob MD  Hospitalist Service  [facility]  Securely message with [employer] (more info)  Text page via [employer] Paging/Directory "     ______________________________________________________________________    Chief Complaint   Laceration of the left hand with infection        History of Present Illness   Patient is a 48-year-old male with a medical history significant for gout, GERD, obstructive sleep apnea and other medical comorbidities including a recent history of a recent history of left third digit laceration who is admitted with infection of the left third digit and hand as well as MTP joint infection.      Patient was seen in his room on admission.  He was awake alert oriented to place person and time and could provide a history.    Per report, patient presented to the ER with pain primarily involving the left third digit centered over PIP joint but with some dorsal hand involvement and lymphangitis.  He is also febrile to 100.5 but otherwise hemodynamically stable when he arrives to the emergency department.     He reportedly had a left middle finger laceration a week ago.  Patient put superglue on it and since that time he has had increased pain and redness.  He is also had associated purulent discharge from the wound.  According to him he was cut by a sharp pocket knife.  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  He typically does not wear protective gloves.  We had a conversation about protecting himself during work.    Preliminary labs done in the ER showed a CRP of 56, BMP unremarkable.  CBC with a white count of 12.8, hemoglobin 13.9.    Blood cultures were done in the ER.  X-ray of the right hand showed results below  Narrative & Impression   EXAM: XR FINGER LEFT G/E 2 VIEWS  LOCATION: [facility]  DATE: 10/18/2023     INDICATION: Finger trauma with infection.  COMPARISON: None.                                                                      IMPRESSION: Negative for fracture or dislocation. No erosive changes. Soft tissue swelling about the PIP dorsally. No radiopaque  foreign bodies.     Patient was started on ceftriaxone and vancomycin.  Orthopedic surgeon was consulted.  Patient was also given medication for pain.  He is admitted for further inpatient cares.  Patient has been made n.p.o. for early morning possible debridement and irrigation.    Patient is on n.p.o.  Tdap was ordered for patient.  Patient rated his pain as 2 out of 10 when seen.      Past Medical History    Medical History  Medical History Date Comments   Viral warts   hands and feet.   Clavicular fracture 1989     Fracture of right foot       GERD.    Obstructive sleep apnea  Seasonal allergies  Gout  Hyperlipidemia    Past Surgical History   No previous surgery    Prior to Admission Medications      Prior to Admission medications    Medication Sig Start Date End Date Taking? Authorizing Provider   allopurinol (ZYLOPRIM) 300 MG tablet Take 150 mg by mouth daily TAKE 1/2 TABLET(150 MG) BY MOUTH EVERY DAY 9/11/23  Yes Reported, Patient   colchicine-probenecid (COLBENEMID) 0.5-500 MG tablet Take 1 tablet by mouth every morning 9/11/23  Yes Reported, Patient   fish oil-omega-3 fatty acids 1000 MG capsule Take 1 g by mouth daily   Yes Reported, Patient   loratadine (CLARITIN) 10 MG tablet Take 10 mg by mouth daily   Yes Reported, Patient   omeprazole (PRILOSEC OTC) 20 MG EC tablet Take 20 mg by mouth daily   Yes Reported, Patient   TURMERIC PO Take 1 tablet by mouth daily   Yes Reported, Patient   ZINC OXIDE PO Take 1 tablet by mouth daily   Yes Reported, Patient        Review of Systems    The 10 point Review of Systems is negative other than noted in the HPI or here.   Social History        moking Tobacco: Never           Smokeless Tobacco: Former             Social History  Tobacco Cessation: Counseling Given: Yes     Social History  Alcohol Use Standard Drinks/Week Comments   No 0 (1 standard drink = 0.6 oz pure alcohol)       Social History  PHQ-2 Answer Date Recorded   PHQ-2 TOTAL SCORE 0 03/01/2021     Social  History  Social Connections Answer Date Recorded   Frequency of Communication with Friends and Family Not on file 12/22/2021     Social History  Financial Resource Strain Answer Date Recorded   Difficulty of Paying Living Expenses Not on file 12/22/2021   Difficulty of Paying Living Expenses Not on file 12/22/2021     Social History  Sex and Gender Information Value Date Recorded   Sex Assigned at Birth Not on file     Gender Identity Not on file     Sexual Orientation Not on file           Family History     Allergies Father       Arthritis Father       Asthma Father       Diabetes Father       Hyperlipidemia Father       Hypertension Father       Allergies Mother       Arthritis Mother       Diabetes Mother         Allergies   No Known Allergies     Physical Exam   Vital Signs: Temp: (!) 100.5  F (38.1  C) Temp src: Temporal BP: 134/59 Pulse: 64   Resp: 20 SpO2: 96 % O2 Device: None (Room air)    Weight: 240 lbs 0 oz    General Aox3, appropriate affect, NAD, on RA  HEENT  MMM, EOMI, PERRL  Chest Adeq E b/l, No wheezing  Heart RRR, No M/R/G  Abd- Soft, NT, BS+  - Deferred,   Extremity- Moving all extremities, No digital clubbing,   No edema  Left hand unable to move left middle finger.  Positive swelling and increased pain with movement.  Left upper extremity elevated on pillow  Neuro- Aox3, CN II- XII intact, No peripheral vision loss  P5/5, T-N, reflexes 2+, plantar reflex downwards,  gait not checked  Skin  Has no tattoo, No skin rash       Medical Decision Making       85min MINUTES SPENT BY ME on the date of service doing chart review, history, exam, documentation & further activities per the note.      Data     I have personally reviewed the following data over the past 24 hrs:    12.8 (H)  \   13.9   / 250     135 100 18.0 /  118 (H)   4.4 24 0.86 \     Procal: N/A CRP: 56.40 (H) Lactic Acid: 0.9         Imaging results reviewed over the past 24 hrs:   Recent Results (from the past 24 hour(s))   Fingers  XR, 2-3 views, left    Narrative    EXAM: XR FINGER LEFT G/E 2 VIEWS  LOCATION: [facility]  DATE: 10/18/2023    INDICATION: Finger trauma with infection.  COMPARISON: None.      Impression    IMPRESSION: Negative for fracture or dislocation. No erosive changes. Soft tissue swelling about the PIP dorsally. No radiopaque foreign bodies.

## 2023-10-18 NOTE — PROGRESS NOTES
Patient in room with family. Report given to OR nurse. Taken via w/c with belongings, family notified of status.

## 2023-10-18 NOTE — PLAN OF CARE
Problem: Adult Inpatient Plan of Care  Goal: Absence of Hospital-Acquired Illness or Injury  Outcome: Progressing  Intervention: Identify and Manage Fall Risk  Recent Flowsheet Documentation  Taken 10/18/2023 0355 by Taye Preciado RN  Safety Promotion/Fall Prevention:   clutter free environment maintained   nonskid shoes/slippers when out of bed   room near nurse's station   room organization consistent   safety round/check completed  Intervention: Prevent and Manage VTE (Venous Thromboembolism) Risk  Recent Flowsheet Documentation  Taken 10/18/2023 0355 by Taye Preciado RN  VTE Prevention/Management: SCDs (sequential compression devices) off     Problem: Infection  Goal: Absence of Infection Signs and Symptoms  Outcome: Progressing     Problem: Pain Acute  Goal: Optimal Pain Control and Function  Outcome: Progressing  Intervention: Develop Pain Management Plan  Recent Flowsheet Documentation  Taken 10/18/2023 0355 by Taye Preciado RN  Pain Management Interventions:   medication (see MAR)   rest  Intervention: Prevent or Manage Pain  Recent Flowsheet Documentation  Taken 10/18/2023 0355 by Taye Preciado RN  Medication Review/Management: medications reviewed   Goal Outcome Evaluation:  VSS on RA, A&Ox4, pain 4/10. Was given one time dose of percocet. Afebrile. L finger swollen, hot, and tender. Finger has some purulent drainage. Unable to bend finger at this time. X-ray negative for fracture. L & R PIV patent and CDI. Up ad tami and NPO. Will continue to monitor.

## 2023-10-18 NOTE — ED TRIAGE NOTES
Pt reports he cut the knuckle on his L middle finger on Thursday.  Pt put superglue on it and since that time, has had increasing pain, redness and swelling.  Pt reports he had some purulent drainage from the wound as well.       Triage Assessment (Adult)       Row Name 10/18/23 0123          Triage Assessment    Airway WDL WDL        Respiratory WDL    Respiratory WDL WDL        Skin Circulation/Temperature WDL    Skin Circulation/Temperature WDL WDL        Cardiac WDL    Cardiac WDL WDL        Peripheral/Neurovascular WDL    Peripheral Neurovascular WDL WDL        Cognitive/Neuro/Behavioral WDL    Cognitive/Neuro/Behavioral WDL WDL

## 2023-10-18 NOTE — ANESTHESIA CARE TRANSFER NOTE
Patient: Caden Melgoza    Procedure: Procedure(s):  INCISION AND DRAINAGE, LEFT MIDDLE FINGER PIP JOINT       Diagnosis: Laceration of left hand with infection, initial encounter [S61.412A, L08.9]  Diagnosis Additional Information: No value filed.    Anesthesia Type:   MAC     Note:    Oropharynx: oropharynx clear of all foreign objects and spontaneously breathing  Level of Consciousness: awake  Oxygen Supplementation: room air    Independent Airway: airway patency satisfactory and stable  Dentition: dentition unchanged  Vital Signs Stable: post-procedure vital signs reviewed and stable  Report to RN Given: handoff report given  Patient transferred to: Medical/Surgical Unit    Handoff Report: Identifed the Patient, Identified the Reponsible Provider, Reviewed the pertinent medical history, Discussed the surgical course, Reviewed Intra-OP anesthesia mangement and issues during anesthesia, Set expectations for post-procedure period and Allowed opportunity for questions and acknowledgement of understanding      Vitals:  Vitals Value Taken Time   /72 10/18/23 1858   Temp     Pulse 48 10/18/23 1858   Resp 12 10/18/23 1858   SpO2 98 % 10/18/23 1858       Electronically Signed By: BRAXTON Alex CRNA  October 18, 2023  6:58 PM

## 2023-10-18 NOTE — ED PROVIDER NOTES
EMERGENCY DEPARTMENT ENCOUNTER      NAME: Caden Melgoza  AGE: 48 year old male  YOB: 1975  MRN: [id number]  EVALUATION DATE & TIME: 10/18/2023  1:25 AM    PCP: Thong Carlos    ED PROVIDER: Omar Reinoso M.D.      Chief Complaint   Patient presents with    Wound Infection       FINAL IMPRESSION:  1. Laceration of left hand with infection, initial encounter        ED COURSE & MEDICAL DECISION MAKIN year old male presents to the Emergency Department for evaluation of left hand infection.  Patient presenting with increasing swelling and pain primarily involving the left third digit centered over PIP joint but with some dorsal hand involvement and lymphangitis.  He is also febrile to 100.5 but otherwise hemodynamically stable when he arrives to the emergency department.  Labs reveal an elevated white blood cell count and CRP consistent with early systemic infection.  X-rays negative for subcutaneous emphysema or other significant trauma.  Given rapid progression of infection, fusiform digit swelling, we discussed admitting him to the hospital.  Blood cultures were obtained and he was started on vancomycin and ceftriaxone.  Case discussed with Dr Spencer, orthopedics.  Asks us to soak the hand in warm soapy water for a little while and keep patient n.p.o.  They will evaluate him in the morning.  Case discussed with hospitalist Dr. Jacob.    At the conclusion of the encounter I discussed the results of all of the tests and the disposition. The questions were answered. The patient or family acknowledged understanding and was agreeable with the care plan.     Medical Decision Making    History:  Supplemental history from: Documented in chart, if applicable  External Record(s) reviewed: Documented in chart, if applicable.    Work Up:  Chart documentation includes differential considered and any EKGs or imaging independently interpreted by provider, where specified.  In additional to work up  documented, I considered the following work up: Documented in chart, if applicable.    External consultation:  Discussion of management with another provider: Documented in chart, if applicable    Complicating factors:  Care impacted by chronic illness: N/A  Care affected by social determinants of health: N/A    Disposition considerations: Admit.            MEDICATIONS GIVEN IN THE EMERGENCY:  Medications   vancomycin (VANCOCIN) 2,250 mg in sodium chloride 0.9 % 500 mL intermittent infusion ( Intravenous Restarted 10/18/23 0255)   cefTRIAXone (ROCEPHIN) 1 g vial to attach to  mL bag for ADULTS or NS 50 mL bag for PEDS (0 g Intravenous Stopped 10/18/23 0254)   oxyCODONE-acetaminophen (PERCOCET) 5-325 MG per tablet 1 tablet (1 tablet Oral $Given 10/18/23 0147)       NEW PRESCRIPTIONS STARTED AT TODAY'S ER VISIT  New Prescriptions    No medications on file          =================================================================    HPI    Patient information was obtained from: Patient      Caden Melgoza is a 48 year old male who presents to this ED today for evaluation of left hand infection.  Patient cut the top of his left finger about 1 week ago.  Over the last couple of days he has noticed some increasing swelling and pain of the affected left third digit, now involving the top of his hand as well.  He notes that he felt fever and chills all day today.  Denies any history of other medical conditions.      REVIEW OF SYSTEMS   All systems reviewed and negative except as noted in HPI.    PAST MEDICAL HISTORY:  No past medical history on file.    PAST SURGICAL HISTORY:  No past surgical history on file.        CURRENT MEDICATIONS:    Current Facility-Administered Medications   Medication    vancomycin (VANCOCIN) 2,250 mg in sodium chloride 0.9 % 500 mL intermittent infusion     Current Outpatient Medications   Medication    allopurinol (ZYLOPRIM) 300 MG tablet    colchicine-probenecid (COLBENEMID) 0.5-500 MG  "tablet    fish oil-omega-3 fatty acids 1000 MG capsule    loratadine (CLARITIN) 10 MG tablet    omeprazole (PRILOSEC OTC) 20 MG EC tablet    TURMERIC PO    ZINC OXIDE PO         ALLERGIES:  No Known Allergies    FAMILY HISTORY:  No family history on file.    SOCIAL HISTORY:   Social History     Socioeconomic History    Marital status:        VITALS:  /59   Pulse 64   Temp 98.8  F (37.1  C) (Oral)   Resp 20   Ht 1.956 m (6' 5\")   Wt 108.9 kg (240 lb)   SpO2 96%   BMI 28.46 kg/m      PHYSICAL EXAM    Constitutional: Well developed, Well nourished, NAD.  HENT: Normocephalic, Atraumatic. Neck Supple.  Eyes: EOMI, Conjunctiva normal.  Respiratory: Breathing comfortably on room air. Speaks full sentences easily. Lungs clear to ascultation.  Cardiovascular: Normal heart rate, Regular rhythm. No peripheral edema.  Abdomen: Soft  Musculoskeletal: There is fusiform swelling of the left long finger, there is a laceration directly overlying the PIP joint of the affected digit.  There is swelling erythema and cellulitic change involving a large portion of the dorsum of the left hand.  There is lymphangitis streaking up the left forearm.  Integument: Warm, Dry.  Neurologic: Alert & awake, Normal motor function, Normal sensory function, No focal deficits noted.   Psychiatric: Cooperative. Affect appropriate.     LAB:  All pertinent labs reviewed and interpreted.  Labs Ordered and Resulted from Time of ED Arrival to Time of ED Departure   BASIC METABOLIC PANEL - Abnormal       Result Value    Sodium 135      Potassium 4.4      Chloride 100      Carbon Dioxide (CO2) 24      Anion Gap 11      Urea Nitrogen 18.0      Creatinine 0.86      GFR Estimate >90      Calcium 9.2      Glucose 118 (*)    CRP INFLAMMATION - Abnormal    CRP Inflammation 56.40 (*)    CBC WITH PLATELETS AND DIFFERENTIAL - Abnormal    WBC Count 12.8 (*)     RBC Count 4.56      Hemoglobin 13.9      Hematocrit 41.1      MCV 90      MCH 30.5      " MCHC 33.8      RDW 12.5      Platelet Count 250      % Neutrophils 71      % Lymphocytes 18      % Monocytes 8      Mids % (Monos, Eos, Basos)        % Eosinophils 3      % Basophils 0      % Immature Granulocytes 0      NRBCs per 100 WBC 0      Absolute Neutrophils 9.0 (*)     Absolute Lymphocytes 2.3      Absolute Monocytes 1.0      Mids Abs (Monos, Eos, Basos)        Absolute Eosinophils 0.4      Absolute Basophils 0.1      Absolute Immature Granulocytes 0.0      Absolute NRBCs 0.0     LACTIC ACID WHOLE BLOOD - Normal    Lactic Acid 0.9     BLOOD CULTURE   BLOOD CULTURE       RADIOLOGY:  Reviewed all pertinent imaging. Please see official radiology report.  Fingers XR, 2-3 views, left   Final Result   IMPRESSION: Negative for fracture or dislocation. No erosive changes. Soft tissue swelling about the PIP dorsally. No radiopaque foreign bodies.            Omar Reinoso M.D.  Emergency Medicine  [facility] EMERGENCY DEPARTMENT  [facility] [facility] [address]  [phone]  Dept: [phone]       Omar Reinoso MD  10/18/23 0257       Omar Reinoso MD  10/18/23 0312

## 2023-10-18 NOTE — PHARMACY-VANCOMYCIN DOSING SERVICE
Pharmacy Vancomycin Initial Note  Date of Service 2023  Patient's  1975  48 year old, male  Indication: Sepsis and Skin and Soft Tissue Infection  Current estimated CrCl = pending    Plan:  Give vancomycin  2250 mg IV once.   Reconsult Vanco per RX if continued IP .      Lori Talavera, [facility]

## 2023-10-18 NOTE — MEDICATION SCRIBE - ADMISSION MEDICATION HISTORY
Medication Scribe Admission Medication History    Admission medication history is complete. The information provided in this note is only as accurate as the sources available at the time of the update.    Information Source(s): Patient via in-person    Pertinent Information:     Changes made to PTA medication list:  Added: Allopurinol 300 mg tablet, Colchicine-probenecid 0.5-500 mg tablet(per outside fill history and patient confirmed), Fish oil, Loratadine, Zinc oxied, Omeprazole, Turmeric(patient reported)  Deleted: None  Changed: None    Medication Affordability:  Not including over the counter (OTC) medications, was there a time in the past 3 months when you did not take your medications as prescribed because of cost?: No    Allergies reviewed with patient and updates made in EHR: yes    Medication History Completed By: Werner Coles 10/18/2023 3:01 AM    PTA Med List   Medication Sig Last Dose    allopurinol (ZYLOPRIM) 300 MG tablet Take 150 mg by mouth daily TAKE 1/2 TABLET(150 MG) BY MOUTH EVERY DAY 10/17/2023 at am    colchicine-probenecid (COLBENEMID) 0.5-500 MG tablet Take 1 tablet by mouth every morning 10/17/2023 at am    fish oil-omega-3 fatty acids 1000 MG capsule Take 1 g by mouth daily 10/17/2023 at am    loratadine (CLARITIN) 10 MG tablet Take 10 mg by mouth daily 10/17/2023 at am    omeprazole (PRILOSEC OTC) 20 MG EC tablet Take 20 mg by mouth daily 10/17/2023 at am    TURMERIC PO Take 1 tablet by mouth daily 10/17/2023 at am    ZINC OXIDE PO Take 1 tablet by mouth daily 10/17/2023 at am

## 2023-10-18 NOTE — PROGRESS NOTES
Daily Progress Note        CODE STATUS:  Full Code    10/18/23  Assessment/Plan:   Patient is a 48-year-old male with a medical history significant for gout, GERD, obstructive sleep apnea and other medical comorbidities including a recent history of a recent history of left third digit laceration who is admitted with infection of the left third digit as well as possible PIP joint infection.      Left hand infection  Possible fluid colleciton dorsal aspect of the 3rd PIP joint  Possible septic arthritis, left 3rd PIP joint  -Status post laceration a week ago.  Patient tried to push it up with superglue which made the infection worse.    -Orthopedic surgeon has been consulted. Planning for I&D this evening   -Continue ceftriaxone and vancomycin for now. Check intra-op cultures. Check MRSA nares  -Tdap ordered.  Discussed wearing protective gloves with patient.     Obstructive sleep apnea   - Spot pulse ox check     Gout  -Cont home allopurinol and cochicine-probenecid       Disposition; 1-2 days depending on ortho's recommendation  Barrier to discharge; I&D today      Subjective:  Interval History: Patient seen and examined. Notes, labs, imaging reports personally reviewed. Patient is new to today. No new issues overnight.     Review of Systems:   As mentioned in subjective.    Patient Active Problem List   Diagnosis    Laceration of left hand with infection, initial encounter    Gastroesophageal reflux disease with esophagitis    Gout    ELIUD (obstructive sleep apnea)    Extensor tendon laceration of left hand with open wound    Cellulitis of finger of left hand       Scheduled Meds:   allopurinol  150 mg Oral Daily    cefTRIAXone  1 g Intravenous Q24H    [START ON 10/19/2023] colchicine-probenecid  1 tablet Oral QAM    docusate sodium  100 mg Oral BID    loratadine  10 mg Oral Daily    omeprazole  20 mg Oral Daily    vancomycin  1,250 mg Intravenous Q12H    Zinc Oxide   Oral Daily     Continuous Infusions:  PRN  Meds:.HYDROcodone-acetaminophen, HYDROmorphone, melatonin, naloxone **OR** naloxone **OR** naloxone **OR** naloxone, ondansetron **OR** ondansetron    Objective:  Vital signs in last 24 hours:  Temp:  [97.6  F (36.4  C)-100.5  F (38.1  C)] 97.6  F (36.4  C)  Pulse:  [50-83] 50  Resp:  [16-20] 16  BP: (115-169)/(59-83) 119/65  SpO2:  [96 %-99 %] 97 %        Intake/Output Summary (Last 24 hours) at 10/18/2023 1621  Last data filed at 10/18/2023 1520  Gross per 24 hour   Intake 757 ml   Output --   Net 757 ml       Physical Exam:    General: Not in obvious distress.  HEENT: NC, AT   Chest: Clear to auscultation bilaterally  Heart: S1S2 normal, regular. No M/R/G  Abdomen: Soft. NT, ND. Bowel sounds- active.  Extremities: No legs swelling. A tender bulge over the left 3rd PIP. Mild erythema on the overlying skin  Neuro: Alert and awake, grossly non-focal      Lab Results:(I have personally reviewed the results)    Recent Results (from the past 24 hour(s))   Basic metabolic panel    Collection Time: 10/18/23  1:43 AM   Result Value Ref Range    Sodium 135 135 - 145 mmol/L    Potassium 4.4 3.4 - 5.3 mmol/L    Chloride 100 98 - 107 mmol/L    Carbon Dioxide (CO2) 24 22 - 29 mmol/L    Anion Gap 11 7 - 15 mmol/L    Urea Nitrogen 18.0 6.0 - 20.0 mg/dL    Creatinine 0.86 0.67 - 1.17 mg/dL    GFR Estimate >90 >60 mL/min/1.73m2    Calcium 9.2 8.6 - 10.0 mg/dL    Glucose 118 (H) 70 - 99 mg/dL   CRP inflammation    Collection Time: 10/18/23  1:43 AM   Result Value Ref Range    CRP Inflammation 56.40 (H) <5.00 mg/L   Lactic acid whole blood    Collection Time: 10/18/23  1:43 AM   Result Value Ref Range    Lactic Acid 0.9 0.7 - 2.0 mmol/L   CBC with platelets and differential    Collection Time: 10/18/23  1:43 AM   Result Value Ref Range    WBC Count 12.8 (H) 4.0 - 11.0 10e3/uL    RBC Count 4.56 4.40 - 5.90 10e6/uL    Hemoglobin 13.9 13.3 - 17.7 g/dL    Hematocrit 41.1 40.0 - 53.0 %    MCV 90 78 - 100 fL    MCH 30.5 26.5 - 33.0 pg     MCHC 33.8 31.5 - 36.5 g/dL    RDW 12.5 10.0 - 15.0 %    Platelet Count 250 150 - 450 10e3/uL    % Neutrophils 71 %    % Lymphocytes 18 %    % Monocytes 8 %    Mids % (Monos, Eos, Basos)      % Eosinophils 3 %    % Basophils 0 %    % Immature Granulocytes 0 %    NRBCs per 100 WBC 0 <1 /100    Absolute Neutrophils 9.0 (H) 1.6 - 8.3 10e3/uL    Absolute Lymphocytes 2.3 0.8 - 5.3 10e3/uL    Absolute Monocytes 1.0 0.0 - 1.3 10e3/uL    Mids Abs (Monos, Eos, Basos)      Absolute Eosinophils 0.4 0.0 - 0.7 10e3/uL    Absolute Basophils 0.1 0.0 - 0.2 10e3/uL    Absolute Immature Granulocytes 0.0 <=0.4 10e3/uL    Absolute NRBCs 0.0 10e3/uL   Comprehensive metabolic panel    Collection Time: 10/18/23  7:06 AM   Result Value Ref Range    Sodium 138 135 - 145 mmol/L    Potassium 4.3 3.4 - 5.3 mmol/L    Carbon Dioxide (CO2) 22 22 - 29 mmol/L    Anion Gap 11 7 - 15 mmol/L    Urea Nitrogen 16.4 6.0 - 20.0 mg/dL    Creatinine 0.78 0.67 - 1.17 mg/dL    GFR Estimate >90 >60 mL/min/1.73m2    Calcium 9.3 8.6 - 10.0 mg/dL    Chloride 105 98 - 107 mmol/L    Glucose 158 (H) 70 - 99 mg/dL    Alkaline Phosphatase 93 40 - 129 U/L    AST 19 0 - 45 U/L    ALT 19 0 - 70 U/L    Protein Total 6.8 6.4 - 8.3 g/dL    Albumin 4.4 3.5 - 5.2 g/dL    Bilirubin Total 0.4 <=1.2 mg/dL   CBC with platelets and differential    Collection Time: 10/18/23  7:06 AM   Result Value Ref Range    WBC Count 11.5 (H) 4.0 - 11.0 10e3/uL    RBC Count 4.21 (L) 4.40 - 5.90 10e6/uL    Hemoglobin 12.7 (L) 13.3 - 17.7 g/dL    Hematocrit 38.6 (L) 40.0 - 53.0 %    MCV 92 78 - 100 fL    MCH 30.2 26.5 - 33.0 pg    MCHC 32.9 31.5 - 36.5 g/dL    RDW 12.6 10.0 - 15.0 %    Platelet Count 222 150 - 450 10e3/uL    % Neutrophils 86 %    % Lymphocytes 9 %    % Monocytes 5 %    Mids % (Monos, Eos, Basos)      % Eosinophils 0 %    % Basophils 0 %    % Immature Granulocytes 0 %    NRBCs per 100 WBC 0 <1 /100    Absolute Neutrophils 9.7 (H) 1.6 - 8.3 10e3/uL    Absolute Lymphocytes 1.1 0.8 -  "5.3 10e3/uL    Absolute Monocytes 0.6 0.0 - 1.3 10e3/uL    Mids Abs (Monos, Eos, Basos)      Absolute Eosinophils 0.0 0.0 - 0.7 10e3/uL    Absolute Basophils 0.0 0.0 - 0.2 10e3/uL    Absolute Immature Granulocytes 0.1 <=0.4 10e3/uL    Absolute NRBCs 0.0 10e3/uL   MRSA MSSA PCR, Nasal Swab    Collection Time: 10/18/23 10:04 AM    Specimen: Nares, Bilateral; Swab   Result Value Ref Range    MRSA Target DNA Negative Negative    SA Target DNA Positive        All laboratory and imaging data in the past 24 hours reviewed  Serum Glucose range:   Recent Labs   Lab 10/18/23  0706 10/18/23  0143   * 118*     ABG: No lab results found in last 7 days.  CBC:   Recent Labs   Lab 10/18/23  0706 10/18/23  0143   WBC 11.5* 12.8*   HGB 12.7* 13.9   HCT 38.6* 41.1   MCV 92 90    250   NEUTROPHIL 86 71   LYMPH 9 18   MONOCYTE 5 8   EOSINOPHIL 0 3     Chemistry:   Recent Labs   Lab 10/18/23  0706 10/18/23  0143    135   POTASSIUM 4.3 4.4   CHLORIDE 105 100   CO2 22 24   BUN 16.4 18.0   CR 0.78 0.86   GFRESTIMATED >90 >90   LAUREN 9.3 9.2   PROTTOTAL 6.8  --    ALBUMIN 4.4  --    AST 19  --    ALT 19  --    ALKPHOS 93  --    BILITOTAL 0.4  --      Coags:  No results for input(s): \"INR\", \"PROTIME\", \"PTT\" in the last 168 hours.    Invalid input(s): \"APTT\"  Cardiac Markers:  No results for input(s): \"CKTOTAL\", \"TROPONINI\" in the last 168 hours.       Fingers XR, 2-3 views, left    Result Date: 10/18/2023  EXAM: XR FINGER LEFT G/E 2 VIEWS LOCATION: [facility] DATE: 10/18/2023 INDICATION: Finger trauma with infection. COMPARISON: None.     IMPRESSION: Negative for fracture or dislocation. No erosive changes. Soft tissue swelling about the PIP dorsally. No radiopaque foreign bodies.      Latest radiology report personally reviewed.    Note created using dragon voice recognition software so sounds alike errors may have escaped editing.      10/18/2023   Nj Larson MD  Hospitalist, " [facility]  Pager: [phone]

## 2023-10-18 NOTE — CONSULTS
ORTHOPEDIC CONSULTATION    Consultation  Caden De Paz,  1975, MRN [id number]    Laceration of left hand with infection, initial encounter [S61.412A, L08.9]    PCP: Thong Carlos, [phone]   Code status:  Full Code       Extended Emergency Contact Information  Primary Emergency Contact: ROCAEL DE PAZ  Mobile Phone: [phone]  Relation: Spouse  Secondary Emergency Contact: ROMAN DE PAZ  Mobile Phone: [phone]  Relation: Mother         IMPRESSION:  Left middle finger laceration and infection concerning for septic arthritis of the PIP joint, possible fluid collection of the dorsal aspect of the PIP joint     PLAN:  This patient was discussed with Dr. Spencer, on-call surgeon for [facility] and they are in agreement with the following plan.   -Exam is concerning for a fluid collection and possible PIP septic arthritis.  We would recommend proceeding with a incision and drainage of the middle finger.  We will proceed with this this evening.  -N.p.o. until surgery  -Continue current pain regimen  -Weightbearing as tolerated to the left upper extremity, no activity restrictions of the hand    Thank you for including [facility] in the care of Caden De Paz. It has been a pleasure participating in their care.    Addendum to above note, agree, presumably infected PIP joint.  To the OR today for urgent irrigation debridement, cultures.  We discussed the risk and benefits of the anticipated perioperative course, likely need for antibiotics, possible need for more surgery.  All questions were answered operative consents were signed.        CHIEF COMPLAINT: Laceration of left hand with infection, initial encounter    HISTORY OF PRESENT ILLNESS:  The patient is seen in orthopedic consultation at the request of Lakisha Jacob MD for left middle finger pain and swelling.  The patient is a 48 year old male with PMH significant for gout    The patient presents today with left middle finger PIP  "swelling and erythema as well as pain.  He had a laceration to the dorsal aspect of his left middle finger PIP joint.  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  He initially washed it under tap water and bandaged the cut with superglue, Band-Aid, and duct tape.  He is can just continue to use a bandage and duct tape for the last week or so and had no issues working the rest of the week and golfing over the weekend.  This past Monday evening however he started to notice some purulent drainage from the laceration site and increasing swelling, pain, and erythema that has progressed since that time.  At this point he is in very severe pain localized to the PIP joint.  He has minimal flexion of the middle finger.  Denies any fever/chills at this time.      ALLERGIES:   Review of patient's allergies indicates No Known Allergies      MEDICATIONS UPON ADMISSION:  Medications were reviewed.  They include:   (Not in a hospital admission)        SOCIAL HISTORY:   he  reports that he has never smoked. He has quit using smokeless tobacco.      FAMILY HISTORY:  family history is not on file.      REVIEW OF SYSTEMS:   Reviewed with patient. See HPI, otherwise negative       PHYSICAL EXAMINATION:  Vitals: /65 (BP Location: Right arm)   Pulse 55   Temp 97.8  F (36.6  C) (Oral)   Resp 20   Ht 1.956 m (6' 5\")   Wt 108.9 kg (240 lb)   SpO2 96%   BMI 28.46 kg/m    General: On examination, the patient is resting comfortably, NAD, awake, and alert and oriented to person, place, time, and, and general circumstances   SKIN: There is discrete swelling and erythema of the dorsal aspect of the third finger PIP joint.  No erythema to the volar aspect of the finger.  Pulses:  dorsalis pedis and posterior tibial pulse is intact and equal bilaterally  Sensation: intact and equal bilaterally to the distal upper extremities.  Tenderness: Tenderness palpation of the dorsal aspect of the PIP joint and dorsal aspect of the third " metacarpal.  There is also tenderness to the volar aspect of the PIP joint  ROM: Flexion at the FDP intact.  Unable to flex at the FDS when isolated.  Flexion at the MCP joint is intact.  He is able to fully extend the finger    Contralateral side= Full range of motion, Negative joint instability findings, 5/5 motor groups about the joint, Non-tender.       RADIOGRAPHIC EVALUATION:  Personally reviewed  EXAM: XR FINGER LEFT G/E 2 VIEWS  LOCATION: [facility]  DATE: 10/18/2023     INDICATION: Finger trauma with infection.  COMPARISON: None.                                                                      IMPRESSION: Negative for fracture or dislocation. No erosive changes. Soft tissue swelling about the PIP dorsally. No radiopaque foreign bodies.    PERTINENT LABS:  Personally reviewed  Recent Labs   Lab Test 10/18/23  0706   HGB 12.7*            GOLDIE LIMON PA-C  Date: 10/18/2023  Time: 10:35 AM  [facility]    CC1:   Lakisha Jacob MD    CC2:   Tohng Carlos

## 2023-10-18 NOTE — ANESTHESIA PREPROCEDURE EVALUATION
Anesthesia Pre-Procedure Evaluation    Patient: Caden Melgoza   MRN: [id number] : 1975        Procedure : Procedure(s):  INCISION AND DRAINAGE, FINGER          No past medical history on file.   No past surgical history on file.   No Known Allergies   Social History     Tobacco Use    Smoking status: Never    Smokeless tobacco: Former   Substance Use Topics    Alcohol use: Not on file      Wt Readings from Last 1 Encounters:   10/18/23 108.9 kg (240 lb)        Anesthesia Evaluation   Pt has not had prior anesthetic         ROS/MED HX  ENT/Pulmonary:     (+) sleep apnea,                                      Neurologic:  - neg neurologic ROS     Cardiovascular:  - neg cardiovascular ROS     METS/Exercise Tolerance:     Hematologic:       Musculoskeletal:       GI/Hepatic:  - neg GI/hepatic ROS     Renal/Genitourinary:  - neg Renal ROS     Endo:  - neg endo ROS     Psychiatric/Substance Use:       Infectious Disease:       Malignancy:       Other:            Physical Exam    Airway        Mallampati: II   TM distance: > 3 FB   Neck ROM: full   Mouth opening: > 3 cm    Respiratory Devices and Support         Dental           Cardiovascular          Rhythm and rate: regular and normal     Pulmonary           breath sounds clear to auscultation           OUTSIDE LABS:  CBC:   Lab Results   Component Value Date    WBC 11.5 (H) 10/18/2023    WBC 12.8 (H) 10/18/2023    HGB 12.7 (L) 10/18/2023    HGB 13.9 10/18/2023    HCT 38.6 (L) 10/18/2023    HCT 41.1 10/18/2023     10/18/2023     10/18/2023     BMP:   Lab Results   Component Value Date     10/18/2023     10/18/2023    POTASSIUM 4.3 10/18/2023    POTASSIUM 4.4 10/18/2023    CHLORIDE 105 10/18/2023    CHLORIDE 100 10/18/2023    CO2 22 10/18/2023    CO2 24 10/18/2023    BUN 16.4 10/18/2023    BUN 18.0 10/18/2023    CR 0.78 10/18/2023    CR 0.86 10/18/2023     (H) 10/18/2023     (H) 10/18/2023     COAGS: No results found for:  "\"PTT\", \"INR\", \"FIBR\"  POC: No results found for: \"BGM\", \"HCG\", \"HCGS\"  HEPATIC:   Lab Results   Component Value Date    ALBUMIN 4.4 10/18/2023    PROTTOTAL 6.8 10/18/2023    ALT 19 10/18/2023    AST 19 10/18/2023    ALKPHOS 93 10/18/2023    BILITOTAL 0.4 10/18/2023     OTHER:   Lab Results   Component Value Date    LACT 0.9 10/18/2023    LAUREN 9.3 10/18/2023       Anesthesia Plan    ASA Status:  2    NPO Status:  Will be NPO Appropriate at ... 10/18/2023 5:30 PM   Anesthesia Type: MAC.              Consents    Anesthesia Plan(s) and associated risks, benefits, and realistic alternatives discussed. Questions answered and patient/representative(s) expressed understanding.     - Discussed: Risks, Benefits and Alternatives for BOTH SEDATION and the PROCEDURE were discussed     - Discussed with:  Patient            Postoperative Care            Comments:    Other Comments: Block to be performed by surgeon            Modesta Louis MD  "

## 2023-10-18 NOTE — LETTER
[place] [address]  [address] [address]  Phone: [phone]  Fax: [phone]    October 22, 2023        Caden Melgoza  [facility] [facility] [address]          To whom it may concern:    RE: Caden Melgoza    It is to inform that Mr Caden Melgoza was admitted at [facility] from 10/18/23 to 10/22/23. He has had surgery done on his left middle finger. Has been advised to rest at home for couple of weeks from the day of discharge.      Please contact me for questions or concerns.      Sincerely,    FERCHO Beauchamp

## 2023-10-18 NOTE — CONSULTS
"Care Management Initial Consult    General Information  Assessment completed with: PatientCaden  Type of CM/SW Visit: Initial Assessment    Primary Care Provider verified and updated as needed: Yes   Readmission within the last 30 days: no previous admission in last 30 days      Reason for Consult: discharge planning  Advance Care Planning: Advance Care Planning Reviewed: no concerns identified          Communication Assessment  Patient's communication style: spoken language (English or Bilingual)             Cognitive  Cognitive/Neuro/Behavioral: WDL                      Living Environment:   People in home: spouse  Caden and wife Geovanna  Current living Arrangements: house (\"2 level town house\")      Able to return to prior arrangements: yes       Family/Social Support:  Care provided by: self  Provides care for: no one  Marital Status:   Wife  Geovanna       Description of Support System: Supportive, Involved    Support Assessment: Adequate family and caregiver support, Adequate social supports, Patient communicates needs well met    Current Resources:   Patient receiving home care services: No     Community Resources: None  Equipment currently used at home: none  Supplies currently used at home: Oxygen Tubing/Supplies, Other (\"CPAP, glasses\")    Employment/Financial:  Employment Status: employed full-time     Employment/ Comments: \"no  history\"  Financial Concerns:     Referral to Financial Worker: No       Does the patient's insurance plan have a 3 day qualifying hospital stay waiver?  No    Lifestyle & Psychosocial Needs:  Social Determinants of Health     Food Insecurity: Not on file   Depression: Not on file   Housing Stability: Not on file   Tobacco Use: Medium Risk (10/18/2023)    Patient History     Smoking Tobacco Use: Never     Smokeless Tobacco Use: Former     Passive Exposure: Not on file   Financial Resource Strain: Not on file   Alcohol Use: Not on file   Transportation Needs: Not " on file   Physical Activity: Not on file   Interpersonal Safety: Not on file   Stress: Not on file   Social Connections: Not on file       Functional Status:  Prior to admission patient needed assistance:   Dependent ADLs:: Independent, Ambulation-no assistive device  Dependent IADLs:: Independent  Assesssment of Functional Status: At functional baseline    Mental Health Status:          Chemical Dependency Status:                Values/Beliefs:  Spiritual, Cultural Beliefs, Mormon Practices, Values that affect care:                 Additional Information:  Caden lives in a 2 level townhouse with his wife. He is independent with ADLs and IADLs. He works full time and drives.    Likely no discharge needs at this time.     Plans to drive himself home and his car is in the parking lot.    CM to follow for medical progression of care, discharge recommendations, and final discharge plan.    Dyan Palmer RN

## 2023-10-19 LAB
CRP SERPL-MCNC: 43.4 MG/L
ERYTHROCYTE [DISTWIDTH] IN BLOOD BY AUTOMATED COUNT: 12.7 % (ref 10–15)
GLUCOSE BLDC GLUCOMTR-MCNC: 115 MG/DL (ref 70–99)
GRAM STAIN RESULT: NORMAL
GRAM STAIN RESULT: NORMAL
HCT VFR BLD AUTO: 38.6 % (ref 40–53)
HGB BLD-MCNC: 12.8 G/DL (ref 13.3–17.7)
HGB BLD-MCNC: 12.8 G/DL (ref 13.3–17.7)
MCH RBC QN AUTO: 30.8 PG (ref 26.5–33)
MCHC RBC AUTO-ENTMCNC: 33.2 G/DL (ref 31.5–36.5)
MCV RBC AUTO: 93 FL (ref 78–100)
PLATELET # BLD AUTO: 201 10E3/UL (ref 150–450)
RBC # BLD AUTO: 4.15 10E6/UL (ref 4.4–5.9)
VANCOMYCIN SERPL-MCNC: 7.1 UG/ML
WBC # BLD AUTO: 10.6 10E3/UL (ref 4–11)

## 2023-10-19 PROCEDURE — 80202 ASSAY OF VANCOMYCIN: CPT | Performed by: INTERNAL MEDICINE

## 2023-10-19 PROCEDURE — 36415 COLL VENOUS BLD VENIPUNCTURE: CPT | Performed by: PHYSICIAN ASSISTANT

## 2023-10-19 PROCEDURE — 250N000013 HC RX MED GY IP 250 OP 250 PS 637: Performed by: INTERNAL MEDICINE

## 2023-10-19 PROCEDURE — 250N000013 HC RX MED GY IP 250 OP 250 PS 637: Performed by: PHYSICIAN ASSISTANT

## 2023-10-19 PROCEDURE — 99232 SBSQ HOSP IP/OBS MODERATE 35: CPT | Performed by: INTERNAL MEDICINE

## 2023-10-19 PROCEDURE — 258N000003 HC RX IP 258 OP 636: Performed by: INTERNAL MEDICINE

## 2023-10-19 PROCEDURE — 250N000011 HC RX IP 250 OP 636: Mod: JZ | Performed by: INTERNAL MEDICINE

## 2023-10-19 PROCEDURE — 99254 IP/OBS CNSLTJ NEW/EST MOD 60: CPT | Performed by: INTERNAL MEDICINE

## 2023-10-19 PROCEDURE — 36415 COLL VENOUS BLD VENIPUNCTURE: CPT | Performed by: INTERNAL MEDICINE

## 2023-10-19 PROCEDURE — 85027 COMPLETE CBC AUTOMATED: CPT | Performed by: INTERNAL MEDICINE

## 2023-10-19 PROCEDURE — 250N000011 HC RX IP 250 OP 636: Mod: JZ | Performed by: PHYSICIAN ASSISTANT

## 2023-10-19 PROCEDURE — 250N000011 HC RX IP 250 OP 636: Performed by: INTERNAL MEDICINE

## 2023-10-19 PROCEDURE — 120N000001 HC R&B MED SURG/OB

## 2023-10-19 PROCEDURE — 86140 C-REACTIVE PROTEIN: CPT | Performed by: INTERNAL MEDICINE

## 2023-10-19 RX ORDER — PIPERACILLIN SODIUM, TAZOBACTAM SODIUM 3; .375 G/15ML; G/15ML
3.38 INJECTION, POWDER, LYOPHILIZED, FOR SOLUTION INTRAVENOUS ONCE
Qty: 15 ML | Refills: 0 | Status: COMPLETED | OUTPATIENT
Start: 2023-10-19 | End: 2023-10-19

## 2023-10-19 RX ORDER — PIPERACILLIN SODIUM, TAZOBACTAM SODIUM 3; .375 G/15ML; G/15ML
3.38 INJECTION, POWDER, LYOPHILIZED, FOR SOLUTION INTRAVENOUS EVERY 8 HOURS
Status: DISCONTINUED | OUTPATIENT
Start: 2023-10-19 | End: 2023-10-20

## 2023-10-19 RX ORDER — CEFAZOLIN SODIUM 1 G/50ML
1750 SOLUTION INTRAVENOUS EVERY 12 HOURS
Status: DISCONTINUED | OUTPATIENT
Start: 2023-10-20 | End: 2023-10-21

## 2023-10-19 RX ORDER — CEFTRIAXONE 2 G/1
2 INJECTION, POWDER, FOR SOLUTION INTRAMUSCULAR; INTRAVENOUS EVERY 24 HOURS
Status: DISCONTINUED | OUTPATIENT
Start: 2023-10-19 | End: 2023-10-19

## 2023-10-19 RX ORDER — VANCOMYCIN HYDROCHLORIDE 500 MG/10ML
500 INJECTION, POWDER, LYOPHILIZED, FOR SOLUTION INTRAVENOUS ONCE
Status: COMPLETED | OUTPATIENT
Start: 2023-10-19 | End: 2023-10-19

## 2023-10-19 RX ORDER — AMOXICILLIN 250 MG
1 CAPSULE ORAL 2 TIMES DAILY
Qty: 30 TABLET | Refills: 0 | Status: SHIPPED | OUTPATIENT
Start: 2023-10-19

## 2023-10-19 RX ORDER — ACETAMINOPHEN 325 MG/1
975 TABLET ORAL EVERY 8 HOURS
Qty: 60 TABLET | Refills: 0 | Status: SHIPPED | OUTPATIENT
Start: 2023-10-19 | End: 2023-10-22

## 2023-10-19 RX ORDER — OXYCODONE HYDROCHLORIDE 5 MG/1
5 TABLET ORAL EVERY 4 HOURS PRN
Qty: 20 TABLET | Refills: 0 | Status: SHIPPED | OUTPATIENT
Start: 2023-10-19 | End: 2023-10-22

## 2023-10-19 RX ADMIN — PANTOPRAZOLE SODIUM 40 MG: 40 TABLET, DELAYED RELEASE ORAL at 07:09

## 2023-10-19 RX ADMIN — ACETAMINOPHEN 975 MG: 325 TABLET ORAL at 12:08

## 2023-10-19 RX ADMIN — Medication 50 MG: at 09:34

## 2023-10-19 RX ADMIN — OXYCODONE HYDROCHLORIDE 10 MG: 5 TABLET ORAL at 09:34

## 2023-10-19 RX ADMIN — ACETAMINOPHEN 975 MG: 325 TABLET ORAL at 20:39

## 2023-10-19 RX ADMIN — DOCUSATE SODIUM 100 MG: 100 CAPSULE, LIQUID FILLED ORAL at 09:34

## 2023-10-19 RX ADMIN — SENNOSIDES AND DOCUSATE SODIUM 1 TABLET: 8.6; 5 TABLET ORAL at 20:40

## 2023-10-19 RX ADMIN — VANCOMYCIN HYDROCHLORIDE 500 MG: 500 INJECTION, POWDER, LYOPHILIZED, FOR SOLUTION INTRAVENOUS at 17:53

## 2023-10-19 RX ADMIN — DOCUSATE SODIUM 100 MG: 100 CAPSULE, LIQUID FILLED ORAL at 20:40

## 2023-10-19 RX ADMIN — LORATADINE 10 MG: 10 TABLET ORAL at 09:34

## 2023-10-19 RX ADMIN — ALLOPURINOL 150 MG: 300 TABLET ORAL at 09:34

## 2023-10-19 RX ADMIN — PIPERACILLIN AND TAZOBACTAM 3.38 G: 3; .375 INJECTION, POWDER, LYOPHILIZED, FOR SOLUTION INTRAVENOUS at 12:01

## 2023-10-19 RX ADMIN — HYDROCODONE BITARTRATE AND ACETAMINOPHEN 1 TABLET: 5; 325 TABLET ORAL at 00:17

## 2023-10-19 RX ADMIN — VANCOMYCIN HYDROCHLORIDE 1250 MG: 5 INJECTION, POWDER, LYOPHILIZED, FOR SOLUTION INTRAVENOUS at 03:33

## 2023-10-19 RX ADMIN — OXYCODONE HYDROCHLORIDE 10 MG: 5 TABLET ORAL at 17:32

## 2023-10-19 RX ADMIN — CEFAZOLIN SODIUM 2 G: 2 INJECTION, SOLUTION INTRAVENOUS at 04:49

## 2023-10-19 RX ADMIN — VANCOMYCIN HYDROCHLORIDE 1250 MG: 5 INJECTION, POWDER, LYOPHILIZED, FOR SOLUTION INTRAVENOUS at 13:39

## 2023-10-19 RX ADMIN — SENNOSIDES AND DOCUSATE SODIUM 1 TABLET: 8.6; 5 TABLET ORAL at 09:34

## 2023-10-19 RX ADMIN — POLYETHYLENE GLYCOL 3350 17 G: 17 POWDER, FOR SOLUTION ORAL at 09:34

## 2023-10-19 RX ADMIN — HYDROCODONE BITARTRATE AND ACETAMINOPHEN 1 TABLET: 5; 325 TABLET ORAL at 04:48

## 2023-10-19 RX ADMIN — PIPERACILLIN AND TAZOBACTAM 3.38 G: 3; .375 INJECTION, POWDER, LYOPHILIZED, FOR SOLUTION INTRAVENOUS at 17:42

## 2023-10-19 ASSESSMENT — ACTIVITIES OF DAILY LIVING (ADL)
ADLS_ACUITY_SCORE: 18

## 2023-10-19 NOTE — UTILIZATION REVIEW
Admission Status; Secondary Review Determination   Under the authority of the Utilization Management Committee, the utilization review process indicated a secondary review on Caden Melgoza. The review outcome is based on review of the medical records, discussions with staff, and applying clinical experience noted on the date of the review.   (x) Inpatient Status Appropriate - This patient's medical care is consistent with medical management for inpatient care and reasonable inpatient medical practice.     RATIONALE FOR DETERMINATION   48-year-old male admitted with left hand infection following a laceration 1 week ago.  Was taken to the OR by orthopedics and found to have left middle finger septic PIP joint with partial middle finger extensor tendon laceration.  Area was debrided and irrigated.  Surgical cultures obtained and is on IV antibiotics while awaiting results and recovery.  Has had elevated white count, elevated CRP and fever.    At the time of admission with the information available to the attending physician more than 2 nights Hospital complex care was anticipated, based on patient risk of adverse outcome if treated as outpatient and complex care required. Inpatient admission is appropriate based on the Medicare guidelines.   The information on this document is developed by the utilization review team in order for the business office to ensure compliance. This only denotes the appropriateness of proper admission status and does not reflect the quality of care rendered.   The definitions of Inpatient Status and Observation Status used in making the determination above are those provided in the CMS Coverage Manual, Chapter 1 and Chapter 6, section 70.4.   Sincerely,   Tato Barry MD  Utilization Review  Physician Advisor  [facility]

## 2023-10-19 NOTE — PLAN OF CARE
Goal Outcome Evaluation:    Alert and oriented.  Up independently in the room.  Surgery removed dressing today and instructed us to soak and reapply the dressing.  Oxycodone for pain control.  Tolerating diet.  Voiding freely.

## 2023-10-19 NOTE — PROGRESS NOTES
Patient alert and oriented.  Pain is tolerable with PRN norco.  Denies SOB or nausea.  Up independently to bathroom.  States there is some numbness in fingers still.  CMS intact.  Cap refil <3 seconds.  Dressing clean dry intact.  IV patent and draining.

## 2023-10-19 NOTE — PHARMACY-VANCOMYCIN DOSING SERVICE
"Pharmacy Vancomycin Note  Date of Service 2023  Patient's  1975   48 year old, male    Indication: Skin and Soft Tissue Infection  Day of Therapy: 2  Current vancomycin regimen:  1250 mg IV q12h  Current vancomycin monitoring method: AUC  Current vancomycin therapeutic monitoring goal: 400-600 mg*h/L    InsightRX Prediction of Current Vancomycin Regimen  Loading dose: N/A  Regimen: 1250 mg IV every 12 hours.  Start time: 01:39 on 10/20/2023  Exposure target: AUC24 (range)400-600 mg/L.hr   AUC24,ss: 343 mg/L.hr  Probability of AUC24 > 400: 22 %  Ctrough,ss: 8.8 mg/L  Probability of Ctrough,ss > 20: 1 %  Probability of nephrotoxicity (Lodise CORA ): 5 %    Current estimated CrCl = Estimated Creatinine Clearance: 155.4 mL/min (based on SCr of 0.78 mg/dL).    Creatinine for last 3 days  10/18/2023:  1:43 AM Creatinine 0.86 mg/dL;  7:06 AM Creatinine 0.78 mg/dL    Recent Vancomycin Levels (past 3 days)  10/19/2023:  1:23 PM Vancomycin 7.1 ug/mL    Vancomycin IV Administrations (past 72 hours)                     vancomycin (VANCOCIN) 1,250 mg in sodium chloride 0.9 % 250 mL intermittent infusion (mg) 1,250 mg New Bag 10/19/23 1339     1,250 mg New Bag  0333     1,250 mg New Bag 10/18/23 1324    vancomycin (VANCOCIN) 2,250 mg in sodium chloride 0.9 % 500 mL intermittent infusion ()  Restarted 10/18/23 0255     2,250 mg New Bag  0209                    Nephrotoxins and other renal medications (From now, onward)      Start     Dose/Rate Route Frequency Ordered Stop    10/20/23 2200  vancomycin (VANCOCIN) 1,750 mg in sodium chloride 0.9 % 500 mL intermittent infusion         1,750 mg  over 2 Hours Intravenous EVERY 12 HOURS 10/19/23 1521      10/19/23 1800  piperacillin-tazobactam (ZOSYN) 3.375 g vial to attach to  mL bag        Note to Pharmacy: For [facility], [facility] and [facility]: For Zosyn-naive patients, use the \"Zosyn initial dose + extended infusion\" order panel.    3.375 g  over 240 Minutes Intravenous " EVERY 8 HOURS 10/19/23 1128                 Contrast Orders - past 72 hours (72h ago, onward)      None            Interpretation of levels and current regimen:  Vancomycin level is reflective of AUC less than 400    Has serum creatinine changed greater than 50% in last 72 hours: No    Urine output: unable to determine    Renal Function: Stable    InsightRX Prediction of Planned New Vancomycin Regimen  Loading dose: N/A  Regimen: 1750 mg IV every 12 hours.  Start time: 01:39 on 10/20/2023  Exposure target: AUC24 (range)400-600 mg/L.hr   AUC24,ss: 480 mg/L.hr  Probability of AUC24 > 400: 82 %  Ctrough,ss: 12.5 mg/L  Probability of Ctrough,ss > 20: 6 %  Probability of nephrotoxicity (Lodise CORA 2009): 8 %    Plan:  Give 500 mg IV vancomycin x 1. Increase Dose to 1750 mg IV every 12 hours  starting 10/20/23.   Vancomycin monitoring method: AUC  Vancomycin therapeutic monitoring goal: 400-600 mg*h/L  Pharmacy will check vancomycin levels as appropriate in 1-3 Days.  Serum creatinine levels will be ordered daily for the first week of therapy and at least twice weekly for subsequent weeks.    ALFONZO VELARDE [facility]

## 2023-10-19 NOTE — PROGRESS NOTES
Discharge planning assist    Length of Stay (days): 1    Expected Discharge Date: 10/20/2023     Concerns to be Addressed:  Planning discharge with IV antibiotics.    Anticipated Discharge Disposition: Home  Anticipated Discharge Services: IV infusion  Anticipated Discharge DME: Infusion equipment    Referrals Placed by FELIX/ABHAY: [facility];   Private pay costs discussed: See previous care management notes.     Additional Information:  Per ID notes, patient will likely need IV antibiotics after discharge. Currently receiving IV Vancomycin 1250 mg every 12 hours via peripheral IV. Referral made to [facility] for benefits check, coverage. Care management will follow.   3:53 PM:  Reviewed with intake for [facility] who was wondering if patient was planning to use work comp or his commercial UR. Reviewed with patient who stated that he thought that he would be using work comp. Update provided to infusion intake.    [employer]    Vidya PH:[phone]  Claim: [id number]  Injury 10/12/2023: cut left middle finger     Alyson Vera RN

## 2023-10-19 NOTE — PROGRESS NOTES
Care Management Follow Up    Length of Stay (days): 1    Expected Discharge Date: 10/20/2023     Concerns to be Addressed: infection, cultures pending, IV antibiotic      Patient plan of care discussed at interdisciplinary rounds: Yes    Anticipated Discharge Disposition:  home     Anticipated Discharge Services:  TBD    Anticipated Discharge DME:  none    Education Provided on the Discharge Plan:  per care team    Patient/Family in Agreement with the Plan:      Referrals Placed by CM/SW:    Private pay costs discussed:     Additional Information:  Patient with PMH for gout, GERD, obstructive sleep apnea and other medical comorbidities including a recent history of a recent history of left third digit laceration who is admitted with infection of the left third digit and hand as well as MTP joint infection.    Orthopedics following Left middle finger septic PIP joint. POD 1 I&D.        Social History:  Caden lives in a 2 level townhouse with his wife. He is independent with ADLs and IADLs. He works full time and drives.  Plans to drive himself home and his car is in the parking lot.      10/19/23:  Met with patient to enquire on health insurance. Patient states he has insurance through work and will have his spouse bring in his health insurance card. He states the injury is work related so bills will need to be sent to his workman comp. He is working on filling out the paperwork.    Patient will be discharging home. Will follow for antibiotic recs.    11:57 AM  Claim information:  [employer] contact: Vidya [phone], fax [phone]      Maureen Bryan RN

## 2023-10-19 NOTE — CONSULTS
[facility]    Infectious Disease Consultation     Date of Admission:  10/18/2023  Date of Consult (When I saw the patient): 10/19/23    Assessment & Plan   Caden Melgoza is a 48 year old male who was admitted on 10/18/2023.     Impression:    Left middle finger, septic PIP joint  Partial middle finger extensor tendon laceration, with a   Status post irrigation debridement left middle finger septic PIP joint, intraoperative cultures in process  Comorbid conditions- gout, ELIUD, Body mass index is 28.46 kg/m .     Photo taken on 10/19/2023 with patient's permission        Recommendations:    Follow intra-op culture results  Empiric vancomycin and zosyn  Focus antibiotic based on culture results  Will likely need IV antibiotic(s) on discharge- care coordinator to evaluate for OPAT  Anticipate patient will remain hospitalized  until cultures finalized and there is clinical improvement  Thank you for consulting Infectious Disease. We will follow with you  Discussed with Orthopedic team -- appreciate input  Updated patient and nurse    Martha Chicas MD  [facility]  Answering Service: [phone]  On-Call ID provider: [phone], option: 9      Reason for Consult   Reason for consult: I was asked to evaluate this patient for     septic arthritis, left 3rd digit, PIP joint.   .    Primary Care Physician   [facility]    Chief Complaint   Infection of finger    History is obtained from the patient and medical records    History of Present Illness     Caden Melgoza is a 48 year old male who presents with history of laceration on finger, presented with injury after cutting boxes at work.  Patient had injury to the index finger in the middle finger, injury in the middle finger continued to worsen with swelling.  Patient had applied some superglue.  Swelling pain and edema worsen, admitted and debrided  Discussed test results  Throbbing, swelling    Past  Medical History   I have reviewed this patient's medical history and updated it with pertinent information if needed.     Patient Active Problem List   Diagnosis    Laceration of left hand with infection, initial encounter    Gastroesophageal reflux disease with esophagitis    Gout    ELIUD (obstructive sleep apnea)    Extensor tendon laceration of left hand with open wound    Cellulitis of finger of left hand         Past Surgical History   I have reviewed this patient's surgical history and updated it with pertinent information if needed.  Past Surgical History:   Procedure Laterality Date    INCISION AND DRAINAGE FINGER, COMBINED Left 10/18/2023    Procedure: INCISION AND DRAINAGE, LEFT MIDDLE FINGER PIP JOINT;  Surgeon: Cooper Spencer MD;  Location: [facility] [place] [facility]       Prior to Admission Medications   Prior to Admission Medications   Prescriptions Last Dose Informant Patient Reported? Taking?   TURMERIC PO 10/17/2023 at am Self Yes Yes   Sig: Take 1 tablet by mouth daily   ZINC OXIDE PO 10/17/2023 at am Self Yes Yes   Sig: Take 1 tablet by mouth daily   allopurinol (ZYLOPRIM) 300 MG tablet 10/17/2023 at am Self Yes Yes   Sig: Take 150 mg by mouth daily TAKE 1/2 TABLET(150 MG) BY MOUTH EVERY DAY   colchicine-probenecid (COLBENEMID) 0.5-500 MG tablet 10/17/2023 at am Self Yes Yes   Sig: Take 1 tablet by mouth every morning   fish oil-omega-3 fatty acids 1000 MG capsule 10/17/2023 at am Self Yes Yes   Sig: Take 1 g by mouth daily   loratadine (CLARITIN) 10 MG tablet 10/17/2023 at am Self Yes Yes   Sig: Take 10 mg by mouth daily   omeprazole (PRILOSEC OTC) 20 MG EC tablet 10/17/2023 at am Self Yes Yes   Sig: Take 20 mg by mouth daily      Facility-Administered Medications: None     Allergies   No Known Allergies    Immunization History   Immunization History   Administered Date(s) Administered    TDAP (Adacel,Boostrix) 12/28/2022, 10/18/2023       Social History   I have reviewed this patient's social  history and updated it with pertinent information if needed. Caden Melgoza  reports that he has never smoked. He has quit using smokeless tobacco.    Family History   I have reviewed this patient's family history and updated it with pertinent information if needed.   No family history on file.    Review of Systems   The 10 point Review of Systems is negative other than noted in the HPI or here.     Physical Exam   Temp: 98.5  F (36.9  C) Temp src: Oral BP: 125/73 Pulse: 53   Resp: 18 SpO2: 95 % O2 Device: None (Room air)    Vital Signs with Ranges  Temp:  [97.6  F (36.4  C)-98.5  F (36.9  C)] 98.5  F (36.9  C)  Pulse:  [48-54] 53  Resp:  [12-18] 18  BP: (119-170)/(65-82) 125/73  SpO2:  [95 %-98 %] 95 %  240 lbs 0 oz  Body mass index is 28.46 kg/m .    GENERAL APPEARANCE:  awake, NAD  EYES: Eyes grossly normal to inspection, conjunctivae and sclerae normal  HENT: mouth without ulcers or lesions  NECK: supple  RESP: normal breathing pattern  CV: no jvd elevation  LYMPHATICS: swelling  ABDOMEN: soft, nontender, nondistended  MS: extremities normal- finger swelling, incision   SKIN: edema  NEURO: coherent      LABORATORY DATA:  Reviewed    CBC RESULTS:   Reviewed     Recent Labs   Lab Test 10/19/23  0651   WBC 10.6   RBC 4.15*   HGB 12.8*  12.8*   HCT 38.6*   MCV 93   MCH 30.8   MCHC 33.2   RDW 12.7           Last Comprehensive Metabolic Panel:  Sodium   Date Value Ref Range Status   10/18/2023 138 135 - 145 mmol/L Final     Comment:     Reference intervals for this test were updated on 09/26/2023 to more accurately reflect our healthy population. There may be differences in the flagging of prior results with similar values performed with this method. Interpretation of those prior results can be made in the context of the updated reference intervals.      Potassium   Date Value Ref Range Status   10/18/2023 4.3 3.4 - 5.3 mmol/L Final     Chloride   Date Value Ref Range Status   10/18/2023 105 98 - 107 mmol/L  "Final     Carbon Dioxide (CO2)   Date Value Ref Range Status   10/18/2023 22 22 - 29 mmol/L Final     Anion Gap   Date Value Ref Range Status   10/18/2023 11 7 - 15 mmol/L Final     GLUCOSE BY METER POCT   Date Value Ref Range Status   10/19/2023 115 (H) 70 - 99 mg/dL Final     Urea Nitrogen   Date Value Ref Range Status   10/18/2023 16.4 6.0 - 20.0 mg/dL Final     Creatinine   Date Value Ref Range Status   10/18/2023 0.78 0.67 - 1.17 mg/dL Final     GFR Estimate   Date Value Ref Range Status   10/18/2023 >90 >60 mL/min/1.73m2 Final     Calcium   Date Value Ref Range Status   10/18/2023 9.3 8.6 - 10.0 mg/dL Final     Bilirubin Total   Date Value Ref Range Status   10/18/2023 0.4 <=1.2 mg/dL Final     Alkaline Phosphatase   Date Value Ref Range Status   10/18/2023 93 40 - 129 U/L Final     ALT   Date Value Ref Range Status   10/18/2023 19 0 - 70 U/L Final     Comment:     Reference intervals for this test were updated on 6/12/2023 to more accurately reflect our healthy population. There may be differences in the flagging of prior results with similar values performed with this method. Interpretation of those prior results can be made in the context of the updated reference intervals.       AST   Date Value Ref Range Status   10/18/2023 19 0 - 45 U/L Final     Comment:     Reference intervals for this test were updated on 6/12/2023 to more accurately reflect our healthy population. There may be differences in the flagging of prior results with similar values performed with this method. Interpretation of those prior results can be made in the context of the updated reference intervals.             No results found for: \"CRP\"         MICROBIOLOGY:    Reviewed    Blood cultures  Other Micro:  Reviewed   7-Day Micro Results       Collected Updated Procedure Result Status      10/18/2023 1835 10/18/2023 1908 Anaerobic Bacterial Culture Routine [[id number]]   Tissue from Finger, Left    In process Component Value   No " component results               10/18/2023 1835 10/19/2023 0103 Gram Stain [[id number]]   Tissue from Finger, Left    Final result Component Value   Gram Stain Result No organisms seen   Gram Stain Result 3+ WBC seen   Predominantly PMNs            10/18/2023 1835 10/18/2023 1908 Fungal or Yeast Culture Routine [[id number]]   Tissue from Finger, Left    In process Component Value   No component results               10/18/2023 1835 10/18/2023 1908 Tissue Aerobic Bacterial Culture Routine [[id number]]   Tissue from Finger, Left    In process Component Value   No component results               10/18/2023 1004 10/18/2023 1253 MRSA MSSA PCR, Nasal Swab [[id number]]    Swab from Nares, Bilateral    Final result Component Value   MRSA Target DNA Negative   SA Target DNA Positive            10/18/2023 0156 10/19/2023 0931 Blood Culture Arm, Left [[id number]]   Blood from Arm, Left    Preliminary result Component Value   Culture No growth after 1 day  [P]                10/18/2023 0143 10/19/2023 0931 Blood Culture Arm, Right [[id number]]   Blood from Arm, Right    Preliminary result Component Value   Culture No growth after 1 day  [P]                        RADIOLOGY:    Fingers XR, 2-3 views, left    Result Date: 10/18/2023  EXAM: XR FINGER LEFT G/E 2 VIEWS LOCATION: [facility] DATE: 10/18/2023 INDICATION: Finger trauma with infection. COMPARISON: None.     IMPRESSION: Negative for fracture or dislocation. No erosive changes. Soft tissue swelling about the PIP dorsally. No radiopaque foreign bodies.

## 2023-10-19 NOTE — ANESTHESIA POSTPROCEDURE EVALUATION
Patient: Caden Melgoza    Procedure: Procedure(s):  INCISION AND DRAINAGE, LEFT MIDDLE FINGER PIP JOINT       Anesthesia Type:  MAC    Note:  Disposition: Inpatient   Postop Pain Control: Uneventful            Sign Out: Well controlled pain   PONV: No   Neuro/Psych: Uneventful            Sign Out: Acceptable/Baseline neuro status   Airway/Respiratory: Uneventful            Sign Out: Acceptable/Baseline resp. status   CV/Hemodynamics: Uneventful            Sign Out: Acceptable CV status; No obvious hypovolemia; No obvious fluid overload   Other NRE:    DID A NON-ROUTINE EVENT OCCUR?            Last vitals:  Vitals:    10/18/23 1858 10/18/23 1917 10/18/23 1943   BP: (!) 158/72 (!) 170/74 125/70   Pulse: (!) 48 54    Resp: 12 14    Temp:  36.9  C (98.5  F)    SpO2: 98% 96%        Electronically Signed By: Modesta Louis MD  October 18, 2023  9:47 PM

## 2023-10-19 NOTE — OP NOTE
OPERATIVE REPORT    PATIENT NAME: Caden Melgoza  MRN: [id number]    DATE: 10/18/2023    PREOPERATIVE DIAGNOSIS:   1. Left middle finger septic PIP joint    POSTOPERATIVE DIAGNOSIS:   1. Left middle finger septic PIP joint  2. Partial middle finger extensor tendon laceration    OPERATION:   1. Irrigation, debridement left middle finger septic PIP joint    SURGEON: Cooper Spencer MD    ASSISTANT: VANESSA Roche    STAFF: Circulator: Vidya Tomas RN  Scrub Person: Trudy Campos    TYPE OF ANESTHESIA:  Regional block and IV sedation     IMPLANTS: * No implants in log *    ESTIMATED BLOOD LOSS: 5 cc  URINE OUTPUT: n/a - no gallegos    TOURNIQUET TIME: 20 minutes     COMPLICATION: None.     INDICATIONS: Caden Melgoza is a 48 year old male who sustained a laceration to his left middle finger dorsum.  This was a few days ago.  It has progressively gotten worse, painful, swollen.  Examination is consistent with a left middle finger PIP septic joint.  After a thorough evaluation, Caden was indicated for operative intervention. The risks, benefits, and alternatives were discussed with him preoperatively.  These include but are not limited to injury to blood vessels, tendons, nerves, scarring, stiffness, ongoing infection, and possible need for further surgery.  Doing verbalized understanding of the treatment plan and signed a written consent.     DESCRIPTION OF PROCEDURE: The patient was taken to the operating room and placed the supine position on the operating table. Anesthesia was administered by the Department of Anesthesia.  The left little finger was then anesthetized with a digital block consisting of 1% lidocaine with epinephrine.  The left arm was subsequently prepped and draped in the usual sterile fashion.  A timeout was performed with all OR staff and all were in agreement.  Turnicot was placed at the base of the left middle finger.  The traumatic laceration was continued distally and proximally for 1  cm in either direction, linear fashion.  Full-thickness flaps were elevated.  There is copious purulence subcutaneously which was tracking dorsally and proximally and onto the ulnar side of the PIP joint.  There was a partial laceration of the extensor colvin between the central slip and the lateral band.  A portion of the central slip, more than 50%, was still intact.  An arthrotomy was made between the lateral band on the radial side of the PIP joint and the central slip.  This allowed exposure of the PIP joint.  There was purulence within the PIP joint also.  No cartilage damage was noted, no foreign bodies were noted.  Cultures were taken for aerobic and anaerobic and fungal identification and sensitivities.  Devitalized tissue was debrided with a tenotomy scissors including extensor tendon, subcutaneous tissue, and skin.  The wound was then irrigated copiously with a liter of saline. Turnicot was removed.  Small areas of hemorrhage were controlled with bipolar electrocautery.  The extensor tendon laceration and arthrotomy were left open.  The skin was closed loosely with 5-0 nylon sutures.    Sterile dressings were applied to the left middle finger. Capillary refill intact < 2 seconds.  Caden was aroused from anesthesia and taken to the recovery room in stable condition.    Cooper Spencer MD

## 2023-10-19 NOTE — PROGRESS NOTES
Daily Progress Note        CODE STATUS:  Full Code    10/18/23  Assessment/Plan:   Patient is a 48-year-old male with a medical history significant for gout, GERD, obstructive sleep apnea and other medical comorbidities including a recent history of a recent history of left third digit laceration who is admitted with infection of the left third digit as well as possible PIP joint infection.      Left hand infection  Fluid colleciton dorsal aspect of the 3rd PIP joint  Septic arthritis, left 3rd PIP joint  -Status post laceration a week ago.  Patient tried to push it up with superglue which made the infection worse.    -Orthopedic surgeon has been consulted. S/P I&D 10/18/23. Found to have purulence in the PIP joint. Cultures obtained  -Continue ceftriaxone and vancomycin for now. ID consult placed     Obstructive sleep apnea   - Spot pulse ox check     Gout  -Cont home allopurinol and cochicine-probenecid       Disposition; Home in 1-2 days   Barrier to discharge; IV antibiotics. ID consult      Subjective:  Interval History: Patient seen and examined. Doing fairly well. Pain is fairly well controlled. No fever or chills.     Review of Systems:   As mentioned in subjective.    Patient Active Problem List   Diagnosis    Laceration of left hand with infection, initial encounter    Gastroesophageal reflux disease with esophagitis    Gout    ELIUD (obstructive sleep apnea)    Extensor tendon laceration of left hand with open wound    Cellulitis of finger of left hand       Scheduled Meds:   acetaminophen  975 mg Oral Q8H    allopurinol  150 mg Oral Daily    colchicine-probenecid  1 tablet Oral QAM    docusate sodium  100 mg Oral BID    [START ON 10/20/2023] influenza quadrivalent (PF) vacc  0.5 mL Intramuscular Prior to discharge    loratadine  10 mg Oral Daily    pantoprazole  40 mg Oral QAM AC    piperacillin-tazobactam  3.375 g Intravenous Once    piperacillin-tazobactam  3.375 g Intravenous Q8H    polyethylene glycol   17 g Oral Daily    senna-docusate  1 tablet Oral BID    sodium chloride (PF)  3 mL Intracatheter Q8H    vancomycin  1,250 mg Intravenous Q12H    zinc gluconate  50 mg Oral Daily     Continuous Infusions:   lactated ringers 10 mL/hr at 10/18/23 2141     PRN Meds:.[START ON 10/21/2023] acetaminophen, sore throat, bisacodyl, HYDROcodone-acetaminophen, HYDROmorphone **OR** HYDROmorphone, HYDROmorphone, lidocaine 4%, lidocaine (buffered or not buffered), magnesium hydroxide, melatonin, naloxone **OR** naloxone **OR** naloxone **OR** naloxone, ondansetron **OR** ondansetron, ondansetron **OR** ondansetron, oxyCODONE **OR** oxyCODONE, prochlorperazine **OR** prochlorperazine, sodium chloride (PF)    Objective:  Vital signs in last 24 hours:  Temp:  [97.6  F (36.4  C)-98.5  F (36.9  C)] 98.5  F (36.9  C)  Pulse:  [48-54] 53  Resp:  [12-18] 18  BP: (119-170)/(65-82) 125/73  SpO2:  [95 %-98 %] 95 %        Intake/Output Summary (Last 24 hours) at 10/18/2023 1621  Last data filed at 10/18/2023 1520  Gross per 24 hour   Intake 757 ml   Output --   Net 757 ml       Physical Exam:    General: Not in obvious distress.  HEENT: NC, AT   Chest: Clear to auscultation bilaterally  Heart: S1S2 normal, regular. No M/R/G  Abdomen: Soft. NT, ND. Bowel sounds- active.  Extremities: Left middle finger under a dressing  Neuro: Alert and awake, grossly non-focal      Lab Results:(I have personally reviewed the results)    Recent Results (from the past 24 hour(s))   Gram Stain    Collection Time: 10/18/23  6:35 PM    Specimen: Finger, Left; Tissue   Result Value Ref Range    Gram Stain Result No organisms seen     Gram Stain Result 3+ WBC seen    Glucose by meter    Collection Time: 10/19/23  5:36 AM   Result Value Ref Range    GLUCOSE BY METER POCT 115 (H) 70 - 99 mg/dL   Hemoglobin    Collection Time: 10/19/23  6:51 AM   Result Value Ref Range    Hemoglobin 12.8 (L) 13.3 - 17.7 g/dL   CBC with platelets    Collection Time: 10/19/23  6:51 AM  "  Result Value Ref Range    WBC Count 10.6 4.0 - 11.0 10e3/uL    RBC Count 4.15 (L) 4.40 - 5.90 10e6/uL    Hemoglobin 12.8 (L) 13.3 - 17.7 g/dL    Hematocrit 38.6 (L) 40.0 - 53.0 %    MCV 93 78 - 100 fL    MCH 30.8 26.5 - 33.0 pg    MCHC 33.2 31.5 - 36.5 g/dL    RDW 12.7 10.0 - 15.0 %    Platelet Count 201 150 - 450 10e3/uL   CRP inflammation    Collection Time: 10/19/23  7:29 AM   Result Value Ref Range    CRP Inflammation 43.40 (H) <5.00 mg/L       All laboratory and imaging data in the past 24 hours reviewed  Serum Glucose range:   Recent Labs   Lab 10/19/23  0536 10/18/23  0706 10/18/23  0143   * 158* 118*     ABG: No lab results found in last 7 days.  CBC:   Recent Labs   Lab 10/19/23  0651 10/18/23  0706 10/18/23  0143   WBC 10.6 11.5* 12.8*   HGB 12.8*  12.8* 12.7* 13.9   HCT 38.6* 38.6* 41.1   MCV 93 92 90    222 250   NEUTROPHIL  --  86 71   LYMPH  --  9 18   MONOCYTE  --  5 8   EOSINOPHIL  --  0 3     Chemistry:   Recent Labs   Lab 10/18/23  0706 10/18/23  0143    135   POTASSIUM 4.3 4.4   CHLORIDE 105 100   CO2 22 24   BUN 16.4 18.0   CR 0.78 0.86   GFRESTIMATED >90 >90   LAUREN 9.3 9.2   PROTTOTAL 6.8  --    ALBUMIN 4.4  --    AST 19  --    ALT 19  --    ALKPHOS 93  --    BILITOTAL 0.4  --      Coags:  No results for input(s): \"INR\", \"PROTIME\", \"PTT\" in the last 168 hours.    Invalid input(s): \"APTT\"  Cardiac Markers:  No results for input(s): \"CKTOTAL\", \"TROPONINI\" in the last 168 hours.       Fingers XR, 2-3 views, left    Result Date: 10/18/2023  EXAM: XR FINGER LEFT G/E 2 VIEWS LOCATION: [facility] DATE: 10/18/2023 INDICATION: Finger trauma with infection. COMPARISON: None.     IMPRESSION: Negative for fracture or dislocation. No erosive changes. Soft tissue swelling about the PIP dorsally. No radiopaque foreign bodies.      Latest radiology report personally reviewed.    Note created using dragon voice recognition software so sounds alike errors may have " escaped editing.      10/19/2023   Nj Larson MD  Hospitalist, [facility]  Pager: [phone]

## 2023-10-19 NOTE — PROGRESS NOTES
"Orthopedic Progress Note      Assessment: 1 Day Post-Op  S/P Procedure(s):  INCISION AND DRAINAGE, LEFT MIDDLE FINGER PIP JOINT     Plan:   -Gentle range of motion of the finger as tolerated is okay  -Continue IV antibiotics.  Further antibiotic recommendations per infectious disease team.  I did discuss with the infectious disease team who recommended likely needing IV antibiotics at discharge, we will continue to monitor cultures for growth  -Patient should start warm soapy water soaks 3 times daily.  After soaks please place a light dressing over the incision just to keep covered  -Stable to discharge once final antibiotic recommendations are made.  He should follow-up with Dr. Spencer within a week of discharge she is      Subjective:  Pain: mild  Nausea, Vomiting:  No  Lightheadedness, Dizziness:  No  Neuro:  Patient denies new onset numbness or paresthesias  Fever, chills: No  Chest pain: No  SOB: No    Patient reports feeling well today. Patient reports pain is tolerable with current pain regimen. Patient eating and drinking well. Patient voiding and passing gas however no BM. All questions/concerns answered.      Objective:  /73 (BP Location: Right arm)   Pulse 53   Temp 98.5  F (36.9  C) (Oral)   Resp 18   Ht 1.956 m (6' 5\")   Wt 108.9 kg (240 lb)   SpO2 95%   BMI 28.46 kg/m      The patient is A&Ox3. Appears comfortable, sitting up at bedside.  Sensation is intact.  AIN, median, and motor nerve intact.   Wrist ROM and  strength is intact.  Radial pulse intact.  Incision healing well without any present purulent drainage.  There is erythema and swelling over the dorsal aspect of the PIP joint still      No drain     Pertinent Labs   Lab Results: personally reviewed.   No results found for: \"INR\", \"PROTIME\"  Lab Results   Component Value Date    WBC 10.6 10/19/2023    HGB 12.8 (L) 10/19/2023    HGB 12.8 (L) 10/19/2023    HCT 38.6 (L) 10/19/2023    MCV 93 10/19/2023     10/19/2023 "     Lab Results   Component Value Date     10/18/2023    CO2 22 10/18/2023         Report completed by:  GOLDIE LIMON PA-C  Date: 10/19/2023    [facility]

## 2023-10-20 LAB
CREAT SERPL-MCNC: 0.76 MG/DL (ref 0.67–1.17)
EGFRCR SERPLBLD CKD-EPI 2021: >90 ML/MIN/1.73M2
GLUCOSE BLDC GLUCOMTR-MCNC: 119 MG/DL (ref 70–99)
HGB BLD-MCNC: 12.8 G/DL (ref 13.3–17.7)

## 2023-10-20 PROCEDURE — 250N000011 HC RX IP 250 OP 636: Performed by: INTERNAL MEDICINE

## 2023-10-20 PROCEDURE — 250N000013 HC RX MED GY IP 250 OP 250 PS 637: Performed by: PHYSICIAN ASSISTANT

## 2023-10-20 PROCEDURE — 250N000013 HC RX MED GY IP 250 OP 250 PS 637: Performed by: INTERNAL MEDICINE

## 2023-10-20 PROCEDURE — 82565 ASSAY OF CREATININE: CPT | Performed by: INTERNAL MEDICINE

## 2023-10-20 PROCEDURE — 258N000003 HC RX IP 258 OP 636: Performed by: INTERNAL MEDICINE

## 2023-10-20 PROCEDURE — 99232 SBSQ HOSP IP/OBS MODERATE 35: CPT | Performed by: INTERNAL MEDICINE

## 2023-10-20 PROCEDURE — 99233 SBSQ HOSP IP/OBS HIGH 50: CPT | Performed by: INTERNAL MEDICINE

## 2023-10-20 PROCEDURE — 85018 HEMOGLOBIN: CPT | Performed by: PHYSICIAN ASSISTANT

## 2023-10-20 PROCEDURE — 36415 COLL VENOUS BLD VENIPUNCTURE: CPT | Performed by: INTERNAL MEDICINE

## 2023-10-20 PROCEDURE — 250N000011 HC RX IP 250 OP 636: Mod: JZ | Performed by: INTERNAL MEDICINE

## 2023-10-20 PROCEDURE — 120N000001 HC R&B MED SURG/OB

## 2023-10-20 RX ORDER — CEFAZOLIN SODIUM 2 G/100ML
2 INJECTION, SOLUTION INTRAVENOUS EVERY 8 HOURS
Status: DISCONTINUED | OUTPATIENT
Start: 2023-10-20 | End: 2023-10-22 | Stop reason: HOSPADM

## 2023-10-20 RX ADMIN — OXYCODONE HYDROCHLORIDE 5 MG: 5 TABLET ORAL at 10:18

## 2023-10-20 RX ADMIN — OXYCODONE HYDROCHLORIDE 10 MG: 5 TABLET ORAL at 02:13

## 2023-10-20 RX ADMIN — VANCOMYCIN HYDROCHLORIDE 1750 MG: 5 INJECTION, POWDER, LYOPHILIZED, FOR SOLUTION INTRAVENOUS at 18:02

## 2023-10-20 RX ADMIN — DOCUSATE SODIUM 100 MG: 100 CAPSULE, LIQUID FILLED ORAL at 09:03

## 2023-10-20 RX ADMIN — VANCOMYCIN HYDROCHLORIDE 1750 MG: 5 INJECTION, POWDER, LYOPHILIZED, FOR SOLUTION INTRAVENOUS at 06:00

## 2023-10-20 RX ADMIN — OXYCODONE HYDROCHLORIDE 10 MG: 5 TABLET ORAL at 15:21

## 2023-10-20 RX ADMIN — DOCUSATE SODIUM 100 MG: 100 CAPSULE, LIQUID FILLED ORAL at 20:15

## 2023-10-20 RX ADMIN — PIPERACILLIN AND TAZOBACTAM 3.38 G: 3; .375 INJECTION, POWDER, LYOPHILIZED, FOR SOLUTION INTRAVENOUS at 02:10

## 2023-10-20 RX ADMIN — Medication 50 MG: at 09:03

## 2023-10-20 RX ADMIN — ACETAMINOPHEN 975 MG: 325 TABLET ORAL at 20:16

## 2023-10-20 RX ADMIN — OXYCODONE HYDROCHLORIDE 10 MG: 5 TABLET ORAL at 20:16

## 2023-10-20 RX ADMIN — ACETAMINOPHEN 975 MG: 325 TABLET ORAL at 04:04

## 2023-10-20 RX ADMIN — LORATADINE 10 MG: 10 TABLET ORAL at 09:02

## 2023-10-20 RX ADMIN — SENNOSIDES AND DOCUSATE SODIUM 1 TABLET: 8.6; 5 TABLET ORAL at 20:15

## 2023-10-20 RX ADMIN — SENNOSIDES AND DOCUSATE SODIUM 1 TABLET: 8.6; 5 TABLET ORAL at 09:02

## 2023-10-20 RX ADMIN — ACETAMINOPHEN 975 MG: 325 TABLET ORAL at 12:12

## 2023-10-20 RX ADMIN — PIPERACILLIN AND TAZOBACTAM 3.38 G: 3; .375 INJECTION, POWDER, LYOPHILIZED, FOR SOLUTION INTRAVENOUS at 09:02

## 2023-10-20 RX ADMIN — CEFAZOLIN SODIUM 2 G: 2 INJECTION, SOLUTION INTRAVENOUS at 17:00

## 2023-10-20 RX ADMIN — PANTOPRAZOLE SODIUM 40 MG: 40 TABLET, DELAYED RELEASE ORAL at 06:00

## 2023-10-20 RX ADMIN — ALLOPURINOL 150 MG: 300 TABLET ORAL at 09:03

## 2023-10-20 ASSESSMENT — ACTIVITIES OF DAILY LIVING (ADL)
ADLS_ACUITY_SCORE: 18

## 2023-10-20 NOTE — PLAN OF CARE
Problem: Adult Inpatient Plan of Care  Goal: Absence of Hospital-Acquired Illness or Injury  Intervention: Identify and Manage Fall Risk  Recent Flowsheet Documentation  Taken 10/20/2023 0904 by Gerhard Cartwright RN  Safety Promotion/Fall Prevention: nonskid shoes/slippers when out of bed     Problem: Adult Inpatient Plan of Care  Goal: Optimal Comfort and Wellbeing  Intervention: Monitor Pain and Promote Comfort  Recent Flowsheet Documentation  Taken 10/20/2023 0900 by Gerhard Cartwright RN  Pain Management Interventions: medication (see MAR)    SUBJECTIVE:  Pt ambulated independently. Soaked hand in soap and water. Pain tolerable per patient report. Pt on abx; spoke with infectious disease to narrow down abx.     Goal Outcome Evaluation:      Plan of Care Reviewed With: patient    Overall Patient Progress: improving   Gerhard Swann RN

## 2023-10-20 NOTE — PROGRESS NOTES
Care Management Follow Up    Length of Stay (days): 2    Expected Discharge Date: 10/21/2023     Concerns to be Addressed: no discharge needs identified     Patient plan of care discussed at interdisciplinary rounds: Yes    Anticipated Discharge Disposition:  Home with Home Infusion     Anticipated Discharge Services:  None   Anticipated Discharge DME:  None    Patient/family educated on Medicare website which has current facility and service quality ratings:  Yes  Education Provided on the Discharge Plan:  Yes  Patient/Family in Agreement with the Plan:  Yes    Referrals Placed by CM/SW:  Home Infusion  Private pay costs discussed: Not applicable    Additional Information:  Awaiting cultures.     Discharge plan for [facility]. Workmans Comp case.     [employer]    Vidya PH:[phone]  Claim: [id number]  Injury 10/12/2023: cut left middle finger     Social HX: From home independent, family to transport.     CM will continue to follow care progression and aide in discharge planning as needed.     Loretta Bunch RN

## 2023-10-20 NOTE — PROGRESS NOTES
"Orthopedic Progress Note      Assessment: 2 Days Post-Op  S/P Procedure(s):  INCISION AND DRAINAGE, LEFT MIDDLE FINGER PIP JOINT     Plan:   -Encourage gentle ROM of finger to prevent stiffness  -Continue IV antibiotics.  Further antibiotic recommendations per infectious disease team.  I did discuss with the infectious disease team who recommended likely needing IV antibiotics at discharge, we will continue to monitor cultures for growth  -Patient should start warm soapy water soaks 3 times daily.  After soaks please place a light dressing over the incision just to keep covered  -Stable to discharge once final antibiotic recommendations are made. Okay to discharge from an orthopedic standpoint.   He should follow-up with Dr. Spencer within a week of discharge       Subjective:  Pain: mild  Nausea, Vomiting:  No  Lightheadedness, Dizziness:  No  Neuro:  Patient denies new onset numbness or paresthesias  Fever, chills: No  Chest pain: No  SOB: No    Patient reports feeling well today. Patient reports pain is tolerable with current pain regimen.  Feels pain is improving since yesterday. Patient eating and drinking well. Patient voiding and passing gas however no BM. All questions/concerns answered.      Objective:  /59 (BP Location: Right arm)   Pulse 50   Temp 98  F (36.7  C) (Oral)   Resp 18   Ht 1.956 m (6' 5\")   Wt 108.9 kg (240 lb)   SpO2 95%   BMI 28.46 kg/m      The patient is A&Ox3. Appears comfortable, sitting up at bedside.  Sensation is intact.  AIN, median, and motor nerve intact.   Wrist ROM and  strength is intact.  Radial pulse intact.  Incision healing well without any present purulent drainage.  There is erythema and swelling over the dorsal aspect of the PIP joint still, slightly improved compared to yesterday      No drain     Pertinent Labs   Lab Results: personally reviewed.   No results found for: \"INR\", \"PROTIME\"  Lab Results   Component Value Date    WBC 10.6 10/19/2023    HGB " 12.8 (L) 10/20/2023    HCT 38.6 (L) 10/19/2023    MCV 93 10/19/2023     10/19/2023     Lab Results   Component Value Date     10/18/2023    CO2 22 10/18/2023         Report completed by:  GOLDIE LIMON PA-C  Date: 10/20/2023    [facility]

## 2023-10-20 NOTE — PLAN OF CARE
Problem: Infection  Goal: Absence of Infection Signs and Symptoms  Outcome: Progressing     Problem: Pain Acute  Goal: Optimal Pain Control and Function  Outcome: Progressing  Intervention: Prevent or Manage Pain  Recent Flowsheet Documentation  Taken 10/19/2023 1530 by Amber Fournier RN  Medication Review/Management: medications reviewed   Goal Outcome Evaluation:       No acute events this shift. VSS. Pt is complaining of pain, well-controlled with PO pain meds. Pt is comfortable and awakens to voice. Plan to discharge tomorrow with home infusion.

## 2023-10-20 NOTE — PROGRESS NOTES
Daily Progress Note        CODE STATUS:  Full Code    10/18/23  Assessment/Plan:   Patient is a 48-year-old male with a medical history significant for gout, GERD, obstructive sleep apnea and other medical comorbidities including a recent history of a recent history of left third digit laceration who is admitted with infection of the left third digit as well as possible PIP joint infection.      Left hand infection  Fluid colleciton dorsal aspect of the 3rd PIP joint  Septic arthritis, left 3rd PIP joint  -Status post laceration a week ago.  Patient tried to push it up with superglue which made the infection worse.    -Orthopedic surgeon has been consulted. S/P I&D 10/18/23. Found to have purulence in the PIP joint. Cultures obtained  -Continue ceftriaxone and vancomycin for now. ID consult appreciated. Awaiting final culture report. May need to discharge on IV antibiotics per ID     Obstructive sleep apnea   - Spot pulse ox check     Gout  -Cont home allopurinol and cochicine-probenecid       Disposition; Home when culture reports available  Barrier to discharge; IV antibiotics. Pending culture report      Subjective:  Interval History: Patient seen and examined. No new issues overnight. Pain seems to be well controlled with prn oxycodone.     Review of Systems:   As mentioned in subjective.    Patient Active Problem List   Diagnosis    Laceration of left hand with infection, initial encounter    Gastroesophageal reflux disease with esophagitis    Gout    ELIUD (obstructive sleep apnea)    Extensor tendon laceration of left hand with open wound    Cellulitis of finger of left hand       Scheduled Meds:   acetaminophen  975 mg Oral Q8H    allopurinol  150 mg Oral Daily    ceFAZolin  2 g Intravenous Q8H    colchicine-probenecid  1 tablet Oral QAM    docusate sodium  100 mg Oral BID    influenza quadrivalent (PF) vacc  0.5 mL Intramuscular Prior to discharge    loratadine  10 mg Oral Daily    pantoprazole  40 mg Oral  QAM AC    polyethylene glycol  17 g Oral Daily    senna-docusate  1 tablet Oral BID    sodium chloride (PF)  3 mL Intracatheter Q8H    vancomycin  1,750 mg Intravenous Q12H    zinc gluconate  50 mg Oral Daily     Continuous Infusions:   lactated ringers 10 mL/hr at 10/18/23 2141     PRN Meds:.[START ON 10/21/2023] acetaminophen, sore throat, bisacodyl, HYDROcodone-acetaminophen, HYDROmorphone **OR** HYDROmorphone, lidocaine 4%, lidocaine (buffered or not buffered), magnesium hydroxide, melatonin, naloxone **OR** naloxone **OR** naloxone **OR** naloxone, ondansetron **OR** ondansetron, oxyCODONE **OR** oxyCODONE, prochlorperazine **OR** prochlorperazine, sodium chloride (PF)    Objective:  Vital signs in last 24 hours:  Temp:  [97.7  F (36.5  C)-98.3  F (36.8  C)] 97.7  F (36.5  C)  Pulse:  [50-59] 52  Resp:  [18] 18  BP: (116-132)/(59-61) 132/61  SpO2:  [95 %-97 %] 97 %        Intake/Output Summary (Last 24 hours) at 10/18/2023 1621  Last data filed at 10/18/2023 1520  Gross per 24 hour   Intake 757 ml   Output --   Net 757 ml       Physical Exam:    General: Not in obvious distress.  HEENT: NC, AT   Chest: Clear to auscultation bilaterally  Heart: S1S2 normal, regular. No M/R/G  Abdomen: Soft. NT, ND. Bowel sounds- active.  Extremities: L- shaped incision, left middle finger. Some serosanguinous sippage from the incision noted  Neuro: Alert and awake, grossly non-focal      Lab Results:(I have personally reviewed the results)    Recent Results (from the past 24 hour(s))   Glucose by meter    Collection Time: 10/20/23  4:21 AM   Result Value Ref Range    GLUCOSE BY METER POCT 119 (H) 70 - 99 mg/dL   Hemoglobin    Collection Time: 10/20/23  7:37 AM   Result Value Ref Range    Hemoglobin 12.8 (L) 13.3 - 17.7 g/dL   Creatinine    Collection Time: 10/20/23  7:37 AM   Result Value Ref Range    Creatinine 0.76 0.67 - 1.17 mg/dL    GFR Estimate >90 >60 mL/min/1.73m2       All laboratory and imaging data in the past 24  "hours reviewed  Serum Glucose range:   Recent Labs   Lab 10/20/23  0421 10/19/23  0536 10/18/23  0706 10/18/23  0143   * 115* 158* 118*     ABG: No lab results found in last 7 days.  CBC:   Recent Labs   Lab 10/20/23  0737 10/19/23  0651 10/18/23  0706 10/18/23  0143   WBC  --  10.6 11.5* 12.8*   HGB 12.8* 12.8*  12.8* 12.7* 13.9   HCT  --  38.6* 38.6* 41.1   MCV  --  93 92 90   PLT  --  201 222 250   NEUTROPHIL  --   --  86 71   LYMPH  --   --  9 18   MONOCYTE  --   --  5 8   EOSINOPHIL  --   --  0 3     Chemistry:   Recent Labs   Lab 10/20/23  0737 10/18/23  0706 10/18/23  0143   NA  --  138 135   POTASSIUM  --  4.3 4.4   CHLORIDE  --  105 100   CO2  --  22 24   BUN  --  16.4 18.0   CR 0.76 0.78 0.86   GFRESTIMATED >90 >90 >90   LAUREN  --  9.3 9.2   PROTTOTAL  --  6.8  --    ALBUMIN  --  4.4  --    AST  --  19  --    ALT  --  19  --    ALKPHOS  --  93  --    BILITOTAL  --  0.4  --      Coags:  No results for input(s): \"INR\", \"PROTIME\", \"PTT\" in the last 168 hours.    Invalid input(s): \"APTT\"  Cardiac Markers:  No results for input(s): \"CKTOTAL\", \"TROPONINI\" in the last 168 hours.       Fingers XR, 2-3 views, left    Result Date: 10/18/2023  EXAM: XR FINGER LEFT G/E 2 VIEWS LOCATION: [facility] DATE: 10/18/2023 INDICATION: Finger trauma with infection. COMPARISON: None.     IMPRESSION: Negative for fracture or dislocation. No erosive changes. Soft tissue swelling about the PIP dorsally. No radiopaque foreign bodies.      Latest radiology report personally reviewed.    Note created using dragon voice recognition software so sounds alike errors may have escaped editing.      10/20/2023   Nj Larson MD  Hospitalist, [facility]  Pager: [phone]                "

## 2023-10-20 NOTE — PROGRESS NOTES
[facility]    Referral received  from Alyson Vera RN CM, for IV antibiotics.    [facility] is attempting to verify [place]benefits through patient's [employer] work[employer]'s comp.  Messages have been left with the  , Vidya, but [facility] has not heard back as of 5:00 pm today.  [facility] likely won't hear back until Monday, 10/23.  [facility] will cont to work with them to obtain benefits and approval.      If patient discharges over the weekend, [facility] will run coverage through his UMR plan, until approval is obtained from . Therefore, pt should be able to discharge over the weekend, if medically ready.    Thank you for the referral.    Latisha Hunter RN, BSN  [facility] Liaison  [phone] (Mon through Fri, 8:00 am-5:00 pm)  [phone] (Office)

## 2023-10-20 NOTE — PROGRESS NOTES
[facility]    Infectious Disease Progress Note    Date of Service : 10/20/2023     Assessment & Plan   Caden Melgoza is a 48 year old male who was admitted on 10/18/2023.     ASSESSMENT:  Septic arthritis, Staphylococcus aureus--left middle finger septic PIP joint  Status post irrigation and debridement left middle finger, intraoperative cultures with Staphylococcus aureus  Partial middle finger extensor tendon laceration with a   Comorbid conditions gout ELIUD BMI 28  Photo on 10/19/2023        RECOMMENDATIONS:    Antibiotics-cefazolin and vancomycin  Follow culture results   Focus/de-escalate antibiotics based on final culture results  Due to septic arthritis, likely IV antibiotics for 2 to 3 weeks followed by oral depending on clinical course  Updated patient  Monitor CBC, CMP  Discussed with patient's nurse  ID will follow      Martha Chicas MD  [facility]  [phone]      Interval History   Doing better, range of motion improved  Updated patient regarding culture results    Physical Exam   Temp: 97.7  F (36.5  C) Temp src: Oral BP: 132/61 Pulse: 52   Resp: 18 SpO2: 97 % O2 Device: None (Room air)    Vitals:    10/18/23 0122   Weight: 108.9 kg (240 lb)     Vital Signs with Ranges  Temp:  [97.7  F (36.5  C)-98.3  F (36.8  C)] 97.7  F (36.5  C)  Pulse:  [50-59] 52  Resp:  [18] 18  BP: (116-132)/(59-61) 132/61  SpO2:  [95 %-97 %] 97 %    Constitutional: Awake, no apparent distress  Lungs: normal breathing pattern, no crackles or wheezing  Cardiovascular: S1 S2  Abdomen: non distended  Skin: warm  MSK: Dressing on  Neuro: deconditioned  Psych: able to answer questions    Medications    lactated ringers 10 mL/hr at 10/18/23 2141      acetaminophen  975 mg Oral Q8H    allopurinol  150 mg Oral Daily    colchicine-probenecid  1 tablet Oral QAM    docusate sodium  100 mg Oral BID    influenza quadrivalent (PF) vacc  0.5 mL Intramuscular Prior to discharge  "   loratadine  10 mg Oral Daily    pantoprazole  40 mg Oral QAM AC    piperacillin-tazobactam  3.375 g Intravenous Q8H    polyethylene glycol  17 g Oral Daily    senna-docusate  1 tablet Oral BID    sodium chloride (PF)  3 mL Intracatheter Q8H    vancomycin  1,750 mg Intravenous Q12H    zinc gluconate  50 mg Oral Daily       Data   All microbiology laboratory data reviewed.  Recent Labs   Lab Test 10/20/23  0737 10/19/23  0651 10/18/23  0706 10/18/23  0143   WBC  --  10.6 11.5* 12.8*   HGB 12.8* 12.8*  12.8* 12.7* 13.9   HCT  --  38.6* 38.6* 41.1   MCV  --  93 92 90   PLT  --  201 222 250     Recent Labs   Lab Test 10/20/23  0737 10/18/23  0706 10/18/23  0143   CR 0.76 0.78 0.86     No lab results found.  No lab results found.    Invalid input(s): \"UC\"    MICROBIOLOGY:    Reviewed    7-Day Micro Results       Collected Updated Procedure Result Status      10/18/2023 1835 10/20/2023 0106 Anaerobic Bacterial Culture Routine [[id number]]    Tissue from Finger, Left    Preliminary result Component Value   Culture No anaerobic organisms isolated after 1 day  [P]                10/18/2023 1835 10/19/2023 0103 Gram Stain [[id number]]   Tissue from Finger, Left    Final result Component Value   Gram Stain Result No organisms seen   Gram Stain Result 3+ WBC seen   Predominantly PMNs            10/18/2023 1835 10/20/2023 0106 Fungal or Yeast Culture Routine [[id number]]   Tissue from Finger, Left    Preliminary result Component Value   Culture No growth after 1 day  [P]                10/18/2023 1835 10/20/2023 1326 Tissue Aerobic Bacterial Culture Routine [[id number]]    (Abnormal)   Tissue from Finger, Left    Preliminary result Component Value   Culture Culture in progress  [P]     1+ Staphylococcus aureus  [P]                10/18/2023 1004 10/18/2023 1253 MRSA MSSA PCR, Nasal Swab [[id number]]    Swab from Nares, Bilateral    Final result Component Value   MRSA Target DNA Negative   SA Target DNA Positive    "         10/18/2023 0156 10/20/2023 0932 Blood Culture Arm, Left [[id number]]   Blood from Arm, Left    Preliminary result Component Value   Culture No growth after 2 days  [P]                10/18/2023 0143 10/20/2023 0932 Blood Culture Arm, Right [[id number]]   Blood from Arm, Right    Preliminary result Component Value   Culture No growth after 2 days  [P]                         RADIOLOGY:    Reviewed  Fingers XR, 2-3 views, left    Result Date: 10/18/2023  EXAM: XR FINGER LEFT G/E 2 VIEWS LOCATION: [facility] DATE: 10/18/2023 INDICATION: Finger trauma with infection. COMPARISON: None.     IMPRESSION: Negative for fracture or dislocation. No erosive changes. Soft tissue swelling about the PIP dorsally. No radiopaque foreign bodies.

## 2023-10-20 NOTE — PLAN OF CARE
Goal Outcome Evaluation:    Slept well. Reported 6/10 finger/hand pain start of noc's- PRN oxycodone x1 and prn tylenol effective. IV abx infused. Dressing c/d/I. Independent in room. Ortho following    Temp: 98  F (36.7  C) Temp src: Oral BP: 116/59 Pulse: 50   Resp: 18 SpO2: 95 % O2 Device: None (Room air)

## 2023-10-21 LAB
CREAT SERPL-MCNC: 0.64 MG/DL (ref 0.67–1.17)
EGFRCR SERPLBLD CKD-EPI 2021: >90 ML/MIN/1.73M2
HOLD SPECIMEN: NORMAL
VANCOMYCIN SERPL-MCNC: 9.2 UG/ML

## 2023-10-21 PROCEDURE — 258N000003 HC RX IP 258 OP 636: Performed by: INTERNAL MEDICINE

## 2023-10-21 PROCEDURE — 250N000011 HC RX IP 250 OP 636: Performed by: INTERNAL MEDICINE

## 2023-10-21 PROCEDURE — 250N000013 HC RX MED GY IP 250 OP 250 PS 637: Performed by: INTERNAL MEDICINE

## 2023-10-21 PROCEDURE — 120N000001 HC R&B MED SURG/OB

## 2023-10-21 PROCEDURE — 250N000011 HC RX IP 250 OP 636: Mod: JZ | Performed by: PHYSICIAN ASSISTANT

## 2023-10-21 PROCEDURE — 82565 ASSAY OF CREATININE: CPT | Performed by: INTERNAL MEDICINE

## 2023-10-21 PROCEDURE — 99232 SBSQ HOSP IP/OBS MODERATE 35: CPT | Performed by: INTERNAL MEDICINE

## 2023-10-21 PROCEDURE — 36415 COLL VENOUS BLD VENIPUNCTURE: CPT | Performed by: INTERNAL MEDICINE

## 2023-10-21 PROCEDURE — 80202 ASSAY OF VANCOMYCIN: CPT | Performed by: INTERNAL MEDICINE

## 2023-10-21 PROCEDURE — 250N000013 HC RX MED GY IP 250 OP 250 PS 637: Performed by: PHYSICIAN ASSISTANT

## 2023-10-21 RX ORDER — CEFAZOLIN SODIUM 1 G/50ML
1250 SOLUTION INTRAVENOUS EVERY 8 HOURS
Status: DISCONTINUED | OUTPATIENT
Start: 2023-10-21 | End: 2023-10-22 | Stop reason: HOSPADM

## 2023-10-21 RX ADMIN — ALLOPURINOL 150 MG: 300 TABLET ORAL at 09:27

## 2023-10-21 RX ADMIN — LORATADINE 10 MG: 10 TABLET ORAL at 09:28

## 2023-10-21 RX ADMIN — OXYCODONE HYDROCHLORIDE 10 MG: 5 TABLET ORAL at 15:10

## 2023-10-21 RX ADMIN — PANTOPRAZOLE SODIUM 40 MG: 40 TABLET, DELAYED RELEASE ORAL at 06:36

## 2023-10-21 RX ADMIN — VANCOMYCIN HYDROCHLORIDE 1750 MG: 5 INJECTION, POWDER, LYOPHILIZED, FOR SOLUTION INTRAVENOUS at 09:33

## 2023-10-21 RX ADMIN — SENNOSIDES AND DOCUSATE SODIUM 1 TABLET: 8.6; 5 TABLET ORAL at 09:28

## 2023-10-21 RX ADMIN — ACETAMINOPHEN 975 MG: 325 TABLET ORAL at 03:31

## 2023-10-21 RX ADMIN — DOCUSATE SODIUM 100 MG: 100 CAPSULE, LIQUID FILLED ORAL at 09:27

## 2023-10-21 RX ADMIN — HYDROMORPHONE HYDROCHLORIDE 0.4 MG: 0.2 INJECTION, SOLUTION INTRAMUSCULAR; INTRAVENOUS; SUBCUTANEOUS at 21:40

## 2023-10-21 RX ADMIN — Medication 50 MG: at 09:28

## 2023-10-21 RX ADMIN — DOCUSATE SODIUM 100 MG: 100 CAPSULE, LIQUID FILLED ORAL at 20:48

## 2023-10-21 RX ADMIN — CEFAZOLIN SODIUM 2 G: 2 INJECTION, SOLUTION INTRAVENOUS at 03:26

## 2023-10-21 RX ADMIN — OXYCODONE HYDROCHLORIDE 10 MG: 5 TABLET ORAL at 09:32

## 2023-10-21 RX ADMIN — SENNOSIDES AND DOCUSATE SODIUM 1 TABLET: 8.6; 5 TABLET ORAL at 20:48

## 2023-10-21 RX ADMIN — OXYCODONE HYDROCHLORIDE 10 MG: 5 TABLET ORAL at 03:30

## 2023-10-21 RX ADMIN — ACETAMINOPHEN 975 MG: 325 TABLET ORAL at 12:01

## 2023-10-21 RX ADMIN — VANCOMYCIN HYDROCHLORIDE 1250 MG: 5 INJECTION, POWDER, LYOPHILIZED, FOR SOLUTION INTRAVENOUS at 21:02

## 2023-10-21 RX ADMIN — CEFAZOLIN SODIUM 2 G: 2 INJECTION, SOLUTION INTRAVENOUS at 09:32

## 2023-10-21 RX ADMIN — CEFAZOLIN SODIUM 2 G: 2 INJECTION, SOLUTION INTRAVENOUS at 17:05

## 2023-10-21 ASSESSMENT — ACTIVITIES OF DAILY LIVING (ADL)
ADLS_ACUITY_SCORE: 18

## 2023-10-21 NOTE — PLAN OF CARE
Goal Outcome Evaluation:         Problem: Adult Inpatient Plan of Care  Goal: Absence of Hospital-Acquired Illness or Injury  Intervention: Identify and Manage Fall Risk  Recent Flowsheet Documentation  Taken 10/21/2023 0400 by Sixto Ray RN  Safety Promotion/Fall Prevention: mobility aid in reach  Taken 10/20/2023 2324 by Sixto Ray RN  Safety Promotion/Fall Prevention: mobility aid in reach  Taken 10/20/2023 2000 by Sixto Ray RN  Safety Promotion/Fall Prevention: mobility aid in reach  Intervention: Prevent Skin Injury  Recent Flowsheet Documentation  Taken 10/21/2023 0400 by Sixto Ray RN  Body Position: position changed independently  Taken 10/20/2023 2324 by Sixto Ray RN  Body Position: position changed independently  Taken 10/20/2023 2240 by Sixto Ray RN  Body Position: position changed independently  Taken 10/20/2023 2000 by Sixto Ray RN  Body Position: position changed independently  Intervention: Prevent Infection  Recent Flowsheet Documentation  Taken 10/21/2023 0400 by Sixto Ray RN  Infection Prevention: cohorting utilized  Taken 10/20/2023 2324 by Sixto Ray RN  Infection Prevention: cohorting utilized  Taken 10/20/2023 2000 by Sixto Ray RN  Infection Prevention: cohorting utilized         Patient soaked hand in water and changed dressing, complained of pain and received PRN oxycodone and schedule tylenol, still on abx, and ambulate independently to restroom.

## 2023-10-21 NOTE — PROGRESS NOTES
Daily Progress Note        CODE STATUS:  Full Code    10/18/23  Assessment/Plan:   Patient is a 48-year-old male with a medical history significant for gout, GERD, obstructive sleep apnea and other medical comorbidities including a recent history of a recent history of left third digit laceration who is admitted with infection of the left third digit as well as possible PIP joint infection.      Left hand infection  Fluid colleciton dorsal aspect of the 3rd PIP joint  Septic arthritis, left 3rd PIP joint  -Status post laceration a week ago.  Patient tried to push it up with superglue which made the infection worse.    -Orthopedic surgeon has been consulted. S/P I&D 10/18/23. Found to have purulence in the PIP joint. Cultures obtained  -On Ancef and vancomycin for now. ID consult appreciated. Culture- growing staph aureus. Awaiting final culture report. May need to discharge on IV antibiotics per ID     Obstructive sleep apnea   - Spot pulse ox check     Gout  -Cont home allopurinol and cochicine-probenecid       Disposition; Home when culture reports available  Barrier to discharge; IV antibiotics. Pending culture report    Care plan discussed with family (including wife and son) at bedside.     Subjective:  Interval History: Patient seems to be doing better with less pain. Less swelling and redness. No fevers or chills.     Review of Systems:   As mentioned in subjective.    Patient Active Problem List   Diagnosis    Laceration of left hand with infection, initial encounter    Gastroesophageal reflux disease with esophagitis    Gout    ELIUD (obstructive sleep apnea)    Extensor tendon laceration of left hand with open wound    Cellulitis of finger of left hand       Scheduled Meds:   acetaminophen  975 mg Oral Q8H    allopurinol  150 mg Oral Daily    ceFAZolin  2 g Intravenous Q8H    colchicine-probenecid  1 tablet Oral QAM    docusate sodium  100 mg Oral BID    influenza quadrivalent (PF) vacc  0.5 mL Intramuscular  Prior to discharge    loratadine  10 mg Oral Daily    pantoprazole  40 mg Oral QAM AC    polyethylene glycol  17 g Oral Daily    senna-docusate  1 tablet Oral BID    sodium chloride (PF)  3 mL Intracatheter Q8H    vancomycin  1,750 mg Intravenous Q12H    zinc gluconate  50 mg Oral Daily     Continuous Infusions:   lactated ringers 10 mL/hr at 10/18/23 2141     PRN Meds:.acetaminophen, sore throat, bisacodyl, HYDROcodone-acetaminophen, HYDROmorphone **OR** HYDROmorphone, lidocaine 4%, lidocaine (buffered or not buffered), magnesium hydroxide, melatonin, naloxone **OR** naloxone **OR** naloxone **OR** naloxone, ondansetron **OR** ondansetron, oxyCODONE **OR** oxyCODONE, prochlorperazine **OR** prochlorperazine, sodium chloride (PF)    Objective:  Vital signs in last 24 hours:  Temp:  [97.6  F (36.4  C)-98.7  F (37.1  C)] 98.7  F (37.1  C)  Pulse:  [49-63] 49  Resp:  [18-20] 20  BP: (116-129)/(61-71) 120/71  SpO2:  [95 %-98 %] 95 %        Intake/Output Summary (Last 24 hours) at 10/18/2023 1621  Last data filed at 10/18/2023 1520  Gross per 24 hour   Intake 757 ml   Output --   Net 757 ml       Physical Exam:    General: Not in obvious distress.  HEENT: NC, AT   Chest: Clear to auscultation bilaterally  Heart: S1S2 normal, regular. No M/R/G  Abdomen: Soft. NT, ND. Bowel sounds- active.  Extremities: L- shaped incision, left middle finger. Some serosanguinous sippage from the incision noted  Neuro: Alert and awake, grossly non-focal      Lab Results:(I have personally reviewed the results)    Recent Results (from the past 24 hour(s))   Creatinine    Collection Time: 10/21/23  8:25 AM   Result Value Ref Range    Creatinine 0.64 (L) 0.67 - 1.17 mg/dL    GFR Estimate >90 >60 mL/min/1.73m2   Extra Purple Top Tube    Collection Time: 10/21/23  8:25 AM   Result Value Ref Range    Hold Specimen JIC        All laboratory and imaging data in the past 24 hours reviewed  Serum Glucose range:   Recent Labs   Lab 10/20/23  0508  "10/19/23  0536 10/18/23  0706 10/18/23  0143   * 115* 158* 118*     ABG: No lab results found in last 7 days.  CBC:   Recent Labs   Lab 10/20/23  0737 10/19/23  0651 10/18/23  0706 10/18/23  0143   WBC  --  10.6 11.5* 12.8*   HGB 12.8* 12.8*  12.8* 12.7* 13.9   HCT  --  38.6* 38.6* 41.1   MCV  --  93 92 90   PLT  --  201 222 250   NEUTROPHIL  --   --  86 71   LYMPH  --   --  9 18   MONOCYTE  --   --  5 8   EOSINOPHIL  --   --  0 3     Chemistry:   Recent Labs   Lab 10/21/23  0825 10/20/23  0737 10/18/23  0706 10/18/23  0143   NA  --   --  138 135   POTASSIUM  --   --  4.3 4.4   CHLORIDE  --   --  105 100   CO2  --   --  22 24   BUN  --   --  16.4 18.0   CR 0.64* 0.76 0.78 0.86   GFRESTIMATED >90 >90 >90 >90   LAUREN  --   --  9.3 9.2   PROTTOTAL  --   --  6.8  --    ALBUMIN  --   --  4.4  --    AST  --   --  19  --    ALT  --   --  19  --    ALKPHOS  --   --  93  --    BILITOTAL  --   --  0.4  --      Coags:  No results for input(s): \"INR\", \"PROTIME\", \"PTT\" in the last 168 hours.    Invalid input(s): \"APTT\"  Cardiac Markers:  No results for input(s): \"CKTOTAL\", \"TROPONINI\" in the last 168 hours.       Fingers XR, 2-3 views, left    Result Date: 10/18/2023  EXAM: XR FINGER LEFT G/E 2 VIEWS LOCATION: [facility] DATE: 10/18/2023 INDICATION: Finger trauma with infection. COMPARISON: None.     IMPRESSION: Negative for fracture or dislocation. No erosive changes. Soft tissue swelling about the PIP dorsally. No radiopaque foreign bodies.      Latest radiology report personally reviewed.    Note created using dragon voice recognition software so sounds alike errors may have escaped editing.      10/21/2023   Nj Larson MD  Hospitalist, [facility]  Pager: [phone]                "

## 2023-10-21 NOTE — PLAN OF CARE
Problem: Adult Inpatient Plan of Care  Goal: Optimal Comfort and Wellbeing  Intervention: Monitor Pain and Promote Comfort  Recent Flowsheet Documentation  Taken 10/21/2023 0932 by Gerhard Cartwright RN  Pain Management Interventions: medication (see MAR)     Problem: Infection  Goal: Absence of Infection Signs and Symptoms  Outcome: Progressing     Problem: Adult Inpatient Plan of Care  Goal: Readiness for Transition of Care  Outcome: Progressing    SUBJECTIVE:  Pt ambulated independently. Soaked hand in soap and water. Pain tolerable per patient report. Pt on abx; Vancomycin 500mL and ceFAZolin 2 g in 100 mL infused; saline locked. Possible discharge tomorrow 10/22/23       Goal Outcome Evaluation:      Plan of Care Reviewed With: patient    Overall Patient Progress: improving  Gerhard Swann RN

## 2023-10-21 NOTE — PHARMACY-VANCOMYCIN DOSING SERVICE
Pharmacy Vancomycin Note  Date of Service 2023  Patient's  1975   48 year old, male    Indication: Bone and Joint Infection  Day of Therapy: 4  Current vancomycin regimen:  1750 mg IV q12h  Current vancomycin monitoring method: AUC  Current vancomycin therapeutic monitoring goal: 400-600 mg*h/L    [employer] Prediction of Current Vancomycin Regimen  Regimen: 1750 mg IV every 12 hours.  Start time: 03:33 on 10/22/2023  Exposure target: AUC24 (range)400-600 mg/L.hr   AUC24,ss: 466 mg/L.hr  Probability of AUC24 > 400: 82 %  Ctrough,ss: 11.7 mg/L  Probability of Ctrough,ss > 20: 0 %  Probability of nephrotoxicity (Lodise CORA ): 7 %      Current estimated CrCl = Estimated Creatinine Clearance: 189.5 mL/min (A) (based on SCr of 0.64 mg/dL (L)).    Creatinine for last 3 days  10/20/2023:  7:37 AM Creatinine 0.76 mg/dL  10/21/2023:  8:25 AM Creatinine 0.64 mg/dL    Recent Vancomycin Levels (past 3 days)  10/19/2023:  1:23 PM Vancomycin 7.1 ug/mL  10/21/2023:  8:25 AM Vancomycin 9.2 ug/mL    Vancomycin IV Administrations (past 72 hours)                     vancomycin (VANCOCIN) 1,750 mg in sodium chloride 0.9 % 500 mL intermittent infusion (mg) 1,750 mg Given 10/21/23 0933     1,750 mg Given 10/20/23 1802     1,750 mg Given  0600    vancomycin (VANCOCIN) 500 mg vial to attach to  mL bag (mg) 500 mg New Bag 10/19/23 1753    vancomycin (VANCOCIN) 1,250 mg in sodium chloride 0.9 % 250 mL intermittent infusion (mg) 1,250 mg New Bag 10/19/23 1339     1,250 mg New Bag  0333     1,250 mg New Bag 10/18/23 1324                    Nephrotoxins and other renal medications (From now, onward)      Start     Dose/Rate Route Frequency Ordered Stop    10/21/23 2200  vancomycin (VANCOCIN) 1,250 mg in sodium chloride 0.9 % 250 mL intermittent infusion         1,250 mg  over 90 Minutes Intravenous EVERY 8 HOURS 10/21/23 1136                 Contrast Orders - past 72 hours (72h ago, onward)      None             Interpretation of levels and current regimen:  Vancomycin level is reflective of -600    Has serum creatinine changed greater than 50% in last 72 hours: No    Urine output:  good urine output    Renal Function: Stable    Insight[employer] Prediction of Planned New Vancomycin Regimen  Regimen: 1250 mg IV every 8 hours.  Start time: 03:33 on 10/22/2023  Exposure target: AUC24 (range)400-600 mg/L.hr   AUC24,ss: 499 mg/L.hr  Probability of AUC24 > 400: 91 %  Ctrough,ss: 14.6 mg/L  Probability of Ctrough,ss > 20: 4 %  Probability of nephrotoxicity (Lodise CORA 2009): 10 %      Plan:  Increase Dose to 1250 mg every 8 hours  Vancomycin monitoring method: AUC  Vancomycin therapeutic monitoring goal: 400-600 mg*h/L  Pharmacy will check vancomycin levels as appropriate in 1-3 Days.    Modesta Chou, [facility]

## 2023-10-21 NOTE — PROGRESS NOTES
Called [facility].  Won't have sensitivities until tomorrow.     I can't make a final plan without this info.    RUTHIE Moncada MD

## 2023-10-22 VITALS
HEIGHT: 77 IN | SYSTOLIC BLOOD PRESSURE: 138 MMHG | HEART RATE: 60 BPM | WEIGHT: 240 LBS | BODY MASS INDEX: 28.34 KG/M2 | RESPIRATION RATE: 18 BRPM | DIASTOLIC BLOOD PRESSURE: 84 MMHG | TEMPERATURE: 97.8 F | OXYGEN SATURATION: 98 %

## 2023-10-22 LAB
BACTERIA TISS BX CULT: ABNORMAL
CREAT SERPL-MCNC: 0.63 MG/DL (ref 0.67–1.17)
EGFRCR SERPLBLD CKD-EPI 2021: >90 ML/MIN/1.73M2
HOLD SPECIMEN: NORMAL

## 2023-10-22 PROCEDURE — 250N000013 HC RX MED GY IP 250 OP 250 PS 637: Performed by: INTERNAL MEDICINE

## 2023-10-22 PROCEDURE — 90686 IIV4 VACC NO PRSV 0.5 ML IM: CPT | Performed by: INTERNAL MEDICINE

## 2023-10-22 PROCEDURE — 99232 SBSQ HOSP IP/OBS MODERATE 35: CPT | Performed by: INTERNAL MEDICINE

## 2023-10-22 PROCEDURE — 99239 HOSP IP/OBS DSCHRG MGMT >30: CPT | Performed by: INTERNAL MEDICINE

## 2023-10-22 PROCEDURE — 250N000013 HC RX MED GY IP 250 OP 250 PS 637: Performed by: PHYSICIAN ASSISTANT

## 2023-10-22 PROCEDURE — 250N000011 HC RX IP 250 OP 636: Performed by: INTERNAL MEDICINE

## 2023-10-22 PROCEDURE — 36415 COLL VENOUS BLD VENIPUNCTURE: CPT | Performed by: INTERNAL MEDICINE

## 2023-10-22 PROCEDURE — 82565 ASSAY OF CREATININE: CPT | Performed by: INTERNAL MEDICINE

## 2023-10-22 PROCEDURE — G0008 ADMIN INFLUENZA VIRUS VAC: HCPCS | Performed by: INTERNAL MEDICINE

## 2023-10-22 PROCEDURE — 258N000003 HC RX IP 258 OP 636: Performed by: INTERNAL MEDICINE

## 2023-10-22 PROCEDURE — 250N000011 HC RX IP 250 OP 636: Mod: JZ | Performed by: INTERNAL MEDICINE

## 2023-10-22 RX ORDER — OXYCODONE HYDROCHLORIDE 5 MG/1
5 TABLET ORAL EVERY 4 HOURS PRN
Qty: 8 TABLET | Refills: 0 | Status: SHIPPED | OUTPATIENT
Start: 2023-10-22

## 2023-10-22 RX ORDER — CEPHALEXIN 500 MG/1
500 CAPSULE ORAL 4 TIMES DAILY
Qty: 84 CAPSULE | Refills: 0 | Status: SHIPPED | OUTPATIENT
Start: 2023-10-22 | End: 2023-11-12

## 2023-10-22 RX ORDER — ACETAMINOPHEN 325 MG/1
975 TABLET ORAL EVERY 8 HOURS
Start: 2023-10-22

## 2023-10-22 RX ADMIN — LORATADINE 10 MG: 10 TABLET ORAL at 08:08

## 2023-10-22 RX ADMIN — ALLOPURINOL 150 MG: 300 TABLET ORAL at 08:09

## 2023-10-22 RX ADMIN — Medication 50 MG: at 08:08

## 2023-10-22 RX ADMIN — POLYETHYLENE GLYCOL 3350 17 G: 17 POWDER, FOR SOLUTION ORAL at 08:09

## 2023-10-22 RX ADMIN — OXYCODONE HYDROCHLORIDE 10 MG: 5 TABLET ORAL at 08:24

## 2023-10-22 RX ADMIN — SENNOSIDES AND DOCUSATE SODIUM 1 TABLET: 8.6; 5 TABLET ORAL at 08:09

## 2023-10-22 RX ADMIN — DOCUSATE SODIUM 100 MG: 100 CAPSULE, LIQUID FILLED ORAL at 08:07

## 2023-10-22 RX ADMIN — CEFAZOLIN SODIUM 2 G: 2 INJECTION, SOLUTION INTRAVENOUS at 08:14

## 2023-10-22 RX ADMIN — PANTOPRAZOLE SODIUM 40 MG: 40 TABLET, DELAYED RELEASE ORAL at 08:08

## 2023-10-22 RX ADMIN — VANCOMYCIN HYDROCHLORIDE 1250 MG: 5 INJECTION, POWDER, LYOPHILIZED, FOR SOLUTION INTRAVENOUS at 09:24

## 2023-10-22 RX ADMIN — CEFAZOLIN SODIUM 2 G: 2 INJECTION, SOLUTION INTRAVENOUS at 00:24

## 2023-10-22 RX ADMIN — INFLUENZA A VIRUS A/VICTORIA/4897/2022 IVR-238 (H1N1) ANTIGEN (FORMALDEHYDE INACTIVATED), INFLUENZA A VIRUS A/DARWIN/9/2021 SAN-010 (H3N2) ANTIGEN (FORMALDEHYDE INACTIVATED), INFLUENZA B VIRUS B/PHUKET/3073/2013 ANTIGEN (FORMALDEHYDE INACTIVATED), AND INFLUENZA B VIRUS B/MICHIGAN/01/2021 ANTIGEN (FORMALDEHYDE INACTIVATED) 0.5 ML: 15; 15; 15; 15 INJECTION, SUSPENSION INTRAMUSCULAR at 13:58

## 2023-10-22 ASSESSMENT — ACTIVITIES OF DAILY LIVING (ADL)
ADLS_ACUITY_SCORE: 18

## 2023-10-22 NOTE — DISCHARGE SUMMARY
[facility] MEDICINE  DISCHARGE SUMMARY     Primary Care Physician: Thong Loco  Admission Date: 10/18/2023   Discharge Provider: FERCHO Beauchamp Discharge Date: 10/22/2023   Diet: as below   Code Status: Full Code   Activity: DCACTIVITY: Activity as tolerated        Condition at Discharge: Stable     REASON FOR PRESENTATION(See Admission Note for Details)   Left hand infection    PRINCIPAL & ACTIVE DISCHARGE DIAGNOSES     Principal Problem:    Laceration of left hand with infection, initial encounter  Active Problems:    Gastroesophageal reflux disease with esophagitis    Gout    ELIUD (obstructive sleep apnea)    Extensor tendon laceration of left hand with open wound    Cellulitis of finger of left hand      PENDING LABS     Unresulted Labs Ordered in the Past 30 Days of this Admission       Date and Time Order Name Status Description    10/18/2023  6:36 PM Fungal or Yeast Culture Routine Preliminary     10/18/2023  6:36 PM Anaerobic Bacterial Culture Routine Preliminary     10/18/2023  1:34 AM Blood Culture Arm, Right Preliminary     10/18/2023  1:34 AM Blood Culture Arm, Left Preliminary               PROCEDURES ( this hospitalization only)      Procedure(s):  INCISION AND DRAINAGE, LEFT MIDDLE FINGER PIP JOINT    RECOMMENDATIONS TO OUTPATIENT PROVIDER FOR F/U VISIT     Follow-up Appointments     Follow Up Care      Please follow-up with Dr. Spencer's team in 1 weeks at [facility]. Call our scheduling line at [phone] to make an   appointment, if you do not already have one scheduled.        Follow-up and recommended labs and tests       Follow up with primary care provider, THONG LOCO, within 7 days for   hospital follow- up.   Follow up with Ortho in 1 wk.                DISPOSITION     Home    SUMMARY OF HOSPITAL COURSE:       Patient is a 48-year-old male with a medical history significant for gout, GERD, obstructive sleep apnea and other  medical comorbidities including a recent history of a recent history of left third digit laceration who is admitted with infection of the left third digit as well as possible PIP joint infection.       Left hand infection  Fluid colleciton dorsal aspect of the 3rd PIP joint  Septic arthritis, left 3rd PIP joint  -Status post laceration a week ago.  Patient tried to push it up with superglue which made the infection worse.    -Orthopedic surgeon has been consulted. S/P I&D 10/18/23. Found to have purulence in the PIP joint. Cultures obtained. Culture grew MSSA  -Treated with IV Ancef and vancomycin. ID consult appreciated. Per ID, ok to discharge home on PO Keflex x 21 days with close follow up with ortho; if not responding, would consider IV antibiotics     Obstructive sleep apnea   - Spot pulse ox check     Gout  -Cont home allopurinol and cochicine-probenecid    Discharge Medications with Med changes:     Current Discharge Medication List        START taking these medications    Details   acetaminophen (TYLENOL) 325 MG tablet Take 3 tablets (975 mg) by mouth every 8 hours    Associated Diagnoses: Laceration of left hand with infection, initial encounter      cephALEXin (KEFLEX) 500 MG capsule Take 1 capsule (500 mg) by mouth 4 times daily for 21 days  Qty: 84 capsule, Refills: 0    Associated Diagnoses: Staphylococcal arthritis of left hand (H)      oxyCODONE (ROXICODONE) 5 MG tablet Take 1 tablet (5 mg) by mouth every 4 hours as needed for moderate pain  Qty: 8 tablet, Refills: 0    Associated Diagnoses: Laceration of left hand with infection, initial encounter      senna-docusate (SENOKOT-S/PERICOLACE) 8.6-50 MG tablet Take 1 tablet by mouth 2 times daily  Qty: 30 tablet, Refills: 0    Associated Diagnoses: Laceration of left hand with infection, initial encounter           CONTINUE these medications which have NOT CHANGED    Details   allopurinol (ZYLOPRIM) 300 MG tablet Take 150 mg by mouth daily TAKE 1/2  "TABLET(150 MG) BY MOUTH EVERY DAY      colchicine-probenecid (COLBENEMID) 0.5-500 MG tablet Take 1 tablet by mouth every morning      fish oil-omega-3 fatty acids 1000 MG capsule Take 1 g by mouth daily      loratadine (CLARITIN) 10 MG tablet Take 10 mg by mouth daily      omeprazole (PRILOSEC OTC) 20 MG EC tablet Take 20 mg by mouth daily      TURMERIC PO Take 1 tablet by mouth daily      ZINC OXIDE PO Take 1 tablet by mouth daily                   Rationale for medication changes:      Please see above        Consults   Ortho and ID      Immunizations given this encounter     Most Recent Immunizations   Administered Date(s) Administered     TDAP (Adacel,Boostrix) 10/18/2023   Pended Date(s) Pended     Influenza Vaccine >6 months (Alfuria,Fluzone) 10/22/2023           Anticoagulation Information      Recent INR results: No results for input(s): \"INR\" in the last 168 hours.  Warfarin doses (if applicable) or name of other anticoagulant: NA      SIGNIFICANT IMAGING FINDINGS     Results for orders placed or performed during the hospital encounter of 10/18/23   Fingers XR, 2-3 views, left    Impression    IMPRESSION: Negative for fracture or dislocation. No erosive changes. Soft tissue swelling about the PIP dorsally. No radiopaque foreign bodies.       SIGNIFICANT LABORATORY FINDINGS     Most Recent 3 CBC's:  Recent Labs   Lab Test 10/20/23  0737 10/19/23  0651 10/18/23  0706 10/18/23  0143   WBC  --  10.6 11.5* 12.8*   HGB 12.8* 12.8*  12.8* 12.7* 13.9   MCV  --  93 92 90   PLT  --  201 222 250     Most Recent 3 BMP's:  Recent Labs   Lab Test 10/22/23  1013 10/21/23  0825 10/20/23  0737 10/20/23  0421 10/19/23  0536 10/18/23  0706 10/18/23  0143   NA  --   --   --   --   --  138 135   POTASSIUM  --   --   --   --   --  4.3 4.4   CHLORIDE  --   --   --   --   --  105 100   CO2  --   --   --   --   --  22 24   BUN  --   --   --   --   --  16.4 18.0   CR 0.63* 0.64* 0.76  --   --  0.78 0.86   ANIONGAP  --   --   --   " "--   --  11 11   LAUREN  --   --   --   --   --  9.3 9.2   GLC  --   --   --  119* 115* 158* 118*     Most Recent 2 LFT's:  Recent Labs   Lab Test 10/18/23  0706   AST 19   ALT 19   ALKPHOS 93   BILITOTAL 0.4     Most Recent 3 INR's:No lab results found.      Discharge Orders        Reason for your hospital stay    S/p left finger I/D     When to call - Contact Surgeon Team    You may experience symptoms that require follow-up before your scheduled appointment. Refer to the \"Stoplight Tool\" for instructions on when to contact your Surgeon Team if you are concerned about pain control, blood clots, constipation, or if you are unable to urinate.     When to call - Reach out to Urgent Care    If you are not able to reach your Surgeon Team and you need immediate care, go to the Orthopedic Walk-in Clinic or Urgent Care at your Surgeon's office.  Do NOT go to the Emergency Room unless you have shortness of breath, chest pain, or other signs of a medical emergency.     When to call - Reasons to Call 911    Call 911 immediately if you experience sudden-onset chest pain, arm weakness/numbness, slurred speech, or shortness of breath     Discharge Instruction - Breathing exercises    Perform breathing exercises using your Incentive Spirometer 10 times per hour while awake for 2 weeks.     Symptoms - Fever Management    A low grade fever can be expected after surgery.  Use acetaminophen (TYLENOL) as needed for fever management.  Contact your Surgeon Team if you have a fever greater than 101.5 F, chills, and/or night sweats.     Symptoms - Constipation management    Constipation (hard, dry bowel movements) is expected after surgery due to the combination of being less active, the anesthetic, and the opioid pain medication.  You can do the following to help reduce constipation:  ~  FLUIDS:  Drink clear liquids (water or Gatorade), or juice (apple/prune).  ~  DIET:  Eat a fiber rich diet.    ~  ACTIVITY:  Get up and move around " several times a day.  Increase your activity as you are able.  MEDICATIONS:  Reduce the risk of constipation by starting medications before you are constipated.  You can take Miralax   (1 packet as directed) and/or a stool softener (Senokot 1-2 tablets 1-2 times a day).  If you already have constipation and these medications are not working, you can get magnesium citrate and use as directed.  If you continue to have constipation you can try an over the counter suppository or enema.  Call your Surgeon Team if it has been greater than 3 days since your last bowel movement.     Symptoms - Reduced Urine Output    Changes in the amount of fluids you drank before and after surgery may result in problems urinating.  It is important to stay well-hydrated after surgery and drink plenty of water. If it has been greater than 8 hours since you have urinated despite drinking plenty of water, call your Surgeon Team.     Activity - Exercises to prevent blood clots    Unless otherwise directed by your Surgeon team, perform the following exercises at least three times per day for the first four weeks after surgery to prevent blood clots in your legs: 1) Point and flex your feet (Ankle Pumps), 2) Move your ankle around in big circles, 3) Wiggle your toes, 4) Walk, even for short distances, several times a day, will help decrease the risk of blood clots.     Comfort and Pain Management - Pain after Surgery    Pain after surgery is normal and expected.  You will have some amount of pain for several weeks after surgery.  Your pain will improve with time.  There are several things you can do to help reduce your pain including: rest, ice, elevation, and using pain medications as needed. Contact your Surgeon Team if you have pain that persists or worsens after surgery despite rest, ice, elevation, and taking your medication(s) as prescribed. Contact your Surgeon Team if you have new numbness, tingling, or weakness in your operative  extremity.     Comfort and Pain Management - Swelling after Surgery    Swelling and/or bruising of the surgical extremity is common and may persist for several months after surgery. In addition to frequent icing and elevation, gentle compressive support with an ACE wrap or tubigrip may help with swelling. Apply compression regularly, removing at least twice daily to perform skin checks. Contact your Surgeon Team if your swelling increases and is NOT associated with an increase in your activity level, or if your swelling increases and is associated with redness and pain.     Comfort and Pain Management - Cold therapy    Ice can be used to control swelling and discomfort after surgery. Place a thin towel over your operative site and apply the ice pack overtop. Leave ice pack in place for 20 minutes, then remove for 20 minutes. Repeat this 20 minutes on/20 minutes off routine as often as tolerated.     Medication Instructions - Acetaminophen (TYLENOL) Instructions    You were discharged with acetaminophen (TYLENOL) for pain management after surgery. Acetaminophen most effectively manages pain symptoms when it is taken on a schedule without missing doses (every four, six, or eight hours). Your Provider will prescribe a safe daily dose between 3000 - 4000 mg.  Do NOT exceed this daily dose. Most patients use acetaminophen for pain control for the first four weeks after surgery.  You can wean from this medication as your pain decreases.     Medication Instructions - Opioids - Tapering Instructions    In the first three days following surgery, your symptoms may warrant use of the narcotic pain medication every four to six hours as prescribed. This is normal. As your pain symptoms improve, focus your efforts on decreasing (tapering) use of narcotic medications. The most successful tapering strategy is to first, decrease the number of tablets you take every 4-6 hours to the minimum prescribed. Then, increase the amount of  time between doses.  For example:  First, taper to   or 1 tablet every 4-6 hours.  Then, taper to   or 1 tablet every 6-8 hours.  Then, taper to   or 1 tablet every 8-10 hours.  Then, taper to   or 1 tablet every 10-12 hours.  Then, taper to   or 1 tablet at bedtime.  The bedtime dose can help with comfort during sleep and is typically the last dose to be discontinued after surgery.     Follow Up Care    Please follow-up with Dr. Spencer's team in 1 weeks at [facility]. Call our scheduling line at [phone] to make an appointment, if you do not already have one scheduled.     Medication instructions - No pharmacologic VTE prophylaxis prescribed    Your Surgeon did not prescribe medication for anticoagulation.     Comfort and Pain Management - UPPER extremity Elevation    Swelling is expected for several months after surgery. This type of swelling is usually associated with gravity and activity, and can be improved with elevation.   The best way to do this is to get your hand above your heart by sitting down, resting your elbow on a pillow or arm rest, with your hand in the air. Perform this elevation as often as possible especially for the first two weeks after surgery     Medication Instructions - Opioid Instructions (1 - 2 tablets Q 4-6 hours, MAX 6 tablets)    You were discharged with an opioid medication (hydromorphone, oxycodone, hydrocodone, or tramadol). This medication should only be taken for breakthrough pain that is not controlled with acetaminophen (TYLENOL). If you rate your pain less than 3 you do not need this medication.  Pain rating 0-3:  You do not need this medication.  Pain rating 4-6:  Take 1 tablet every 4-6 hours as needed  Pain rating 7-10:  Take 2 tablets every 4-6 hours as needed.  Do not exceed 6 tablets per day     Return to Driving    Return to driving - Driving is NOT permitted until directed by your provider. Under no circumstance are you permitted to drive while using  "narcotic pain medications.     Dressing / Wound Care - Wound    You have a clean dressing on your surgical wound. Please do warm soapy water soaks three times per day and cover incision with a light dressing after. Contact your Surgeon Team if you have increased redness, warmth around the surgical wound, and/or drainage from the surgical wound.     Dressing / Wound Care - NO Tub Bathing    Tub bathing, swimming, or any other activities that will cause your incision to be submerged in water should be avoided until allowed by your Surgeon.     Activity - Other    Gentle ROM of the finger is okay     Return to School / Work    Return to School / Work: You may return to work/school when directed by your Provider.     Weight bearing as tolerated    Weight bearing as tolerated on your operative extremity.     Dressing Wound Care - Shower with wound/dressing NOT covered    You do not need to cover your dressing or incision in the shower, you may allow water and soap to run over top of the surgical dressing or incision. You may shower 2 days after surgery.  You are strictly prohibited from soaking or submerging the surgical wound underwater.     Reason for your hospital stay    Septic arthritis     Follow-up and recommended labs and tests     Follow up with primary care provider, CHANTE LOCO, within 7 days for hospital follow- up.   Follow up with Ortho in 1 wk.     Activity    Your activity upon discharge: activity as tolerated     Discharge Instruction - Regular Diet Adult    Return to your pre-surgery diet unless instructed otherwise     Diet    Follow this diet upon discharge: Orders Placed This Encounter      Advance Diet as Tolerated: Regular Diet Adult      Discharge Instruction - Regular Diet Adult       Examination   /84 (BP Location: Right leg)   Pulse 60   Temp 97.8  F (36.6  C) (Oral)   Resp 18   Ht 1.956 m (6' 5\")   Wt 108.9 kg (240 lb)   SpO2 98%   BMI 28.46 kg/m      General: Not in obvious " distress.  HEENT: NC, AT   Chest: Clear to auscultation bilaterally  Heart: S1S2 normal, regular. No M/R/G  Abdomen: Soft. NT, ND. Bowel sounds- active.  Extremities: L- shaped incision, left middle finger. Less swelling and less skin erythema today compared to yesterday.   Neuro: Alert and awake, grossly non-focal  Please see EMR for more detailed significant labs, imaging, consultant notes etc.    I, FERCHO Beauchamp, personally saw the patient today and spent greater than 30 minutes discharging this patient.    FERCHO Beauchamp  [facility]    CC:Badroos, Peter A

## 2023-10-22 NOTE — PROGRESS NOTES
Care Management Follow Up    Length of Stay (days): 4    Expected Discharge Date: 10/22/2023 or 10/23/2023     Concerns to be Addressed: infection, cultures pending, IV antibiotic      Patient plan of care discussed at interdisciplinary rounds: Yes     Anticipated Discharge Disposition:  home     Anticipated Discharge Services:  home infusion     Anticipated Discharge DME:  none     Education Provided on the Discharge Plan:  per care team     Patient/Family in Agreement with the Plan:  yes     Referrals Placed by CM/SW:  [facility]    Private pay costs discussed: per [facility]-see 10/20 note     Additional Information:  Patient with PMH for gout, GERD, obstructive sleep apnea and other medical comorbidities including a recent history of a recent history of left third digit laceration who is admitted with infection of the left third digit and hand as well as MTP joint infection.    Orthopedics following Left middle finger septic PIP joint; post I&D.  ID following; currently on IV Vanco and Cefazolin with plan for IV antibiotic post discharge. Awaiting sensitivities.         Social History:  Caden lives in a 2 level townhouse with his wife. He is independent with ADLs and IADLs. He works full time and drives.  Plans to drive himself home and his car is in the parking lot. Patient has [employer] insurance but medical cost will be billed to workman's comp.     Claim information:  [employer] contact: Vidya [phone], fax [phone]      10/22/23:  Awaiting final recommendation for antibiotic treatment. [facility] following.    1:54 PM  Recommendation is for oral antibiotic. Patient will discharge home.     Maureen Bryan RN

## 2023-10-22 NOTE — PROGRESS NOTES
[facility]    Infectious Disease Progress Note    Date of Service : 10/22/2023     Assessment & Plan   Caden Melgoza is a 48 year old male who was admitted on 10/18/2023.     ASSESSMENT:  Septic arthritis, Staphylococcus aureus--left middle finger septic PIP joint  Status post irrigation and debridement left middle finger, intraoperative cultures with Staphylococcus aureus  Partial middle finger extensor tendon laceration with a   Comorbid conditions gout ELIUD BMI 28  Photo on 10/19/2023        RECOMMENDATIONS:    Pt is responding well to IV abx.    Isolate is MSSA  Let's do keflex 500 QID for 21 days.    Needs weekly Follow-up ortho  If things don't improve, THEN we would reconsider and certainly do IV abx      RUTHIE Moncada MD    [facility]  [phone]      Interval History   Doing better, range of motion improved  Hand is WAY better than above picture today    Physical Exam   Temp: 97.8  F (36.6  C) Temp src: Oral BP: 138/84 Pulse: 60   Resp: 18 SpO2: 98 % O2 Device: None (Room air)    Vitals:    10/18/23 0122   Weight: 108.9 kg (240 lb)     Vital Signs with Ranges  Temp:  [97.8  F (36.6  C)-98.3  F (36.8  C)] 97.8  F (36.6  C)  Pulse:  [50-60] 60  Resp:  [18] 18  BP: (123-138)/(71-84) 138/84  SpO2:  [96 %-98 %] 98 %    Constitutional: Awake, no apparent distress  Lungs: normal breathing pattern, no crackles or wheezing  Cardiovascular: S1 S2  Abdomen: non distended  Skin: warm  MSK: Dressing on  Neuro: deconditioned  Psych: able to answer questions    Medications    lactated ringers 10 mL/hr at 10/18/23 2141      allopurinol  150 mg Oral Daily    ceFAZolin  2 g Intravenous Q8H    colchicine-probenecid  1 tablet Oral QAM    docusate sodium  100 mg Oral BID    influenza quadrivalent (PF) vacc  0.5 mL Intramuscular Prior to discharge    loratadine  10 mg Oral Daily    pantoprazole  40 mg Oral QAM AC    polyethylene glycol  17 g Oral Daily    senna-docusate  " 1 tablet Oral BID    sodium chloride (PF)  3 mL Intracatheter Q8H    vancomycin  1,250 mg Intravenous Q8H    zinc gluconate  50 mg Oral Daily       Data   All microbiology laboratory data reviewed.  Recent Labs   Lab Test 10/20/23  0737 10/19/23  0651 10/18/23  0706 10/18/23  0143   WBC  --  10.6 11.5* 12.8*   HGB 12.8* 12.8*  12.8* 12.7* 13.9   HCT  --  38.6* 38.6* 41.1   MCV  --  93 92 90   PLT  --  201 222 250     Recent Labs   Lab Test 10/22/23  1013 10/21/23  0825 10/20/23  0737   CR 0.63* 0.64* 0.76     No lab results found.  No lab results found.    Invalid input(s): \"UC\"    MICROBIOLOGY:    Reviewed    7-Day Micro Results       Collected Updated Procedure Result Status      10/18/2023 1835 10/22/2023 0105 Anaerobic Bacterial Culture Routine [[id number]]    Tissue from Finger, Left    Preliminary result Component Value   Culture No anaerobic organisms isolated after 3 days  [P]                10/18/2023 1835 10/19/2023 0103 Gram Stain [[id number]]   Tissue from Finger, Left    Final result Component Value   Gram Stain Result No organisms seen   Gram Stain Result 3+ WBC seen   Predominantly PMNs            10/18/2023 1835 10/22/2023 0105 Fungal or Yeast Culture Routine [[id number]]   Tissue from Finger, Left    Preliminary result Component Value   Culture No growth after 3 days  [P]                10/18/2023 1835 10/22/2023 0825 Tissue Aerobic Bacterial Culture Routine [[id number]]     (Abnormal)   Tissue from Finger, Left    Final result Component Value   Culture 1+ Staphylococcus aureus        Susceptibility        Staphylococcus aureus      SHIRIN      Clindamycin 0.25 ug/mL Susceptible      Daptomycin 1 ug/mL Susceptible      Doxycycline <=0.5 ug/mL Susceptible      Erythromycin <=0.25 ug/mL Susceptible      Gentamicin <=0.5 ug/mL Susceptible      Oxacillin 1.0 ug/mL Susceptible  [1]       Tetracycline >=16 ug/mL Resistant      Trimethoprim/Sulfamethoxazole <=0.5/9.5 ug/mL Susceptible      " Vancomycin 1 ug/mL Susceptible                   [1]  Oxacillin susceptible isolates are susceptible to cephalosporins (example: cefazolin and cephalexin) and beta lactam combination agents. Oxacillin resistant isolates are resistant to these agents.                   10/18/2023 1004 10/18/2023 1253 MRSA MSSA PCR, Nasal Swab [[id number]]    Swab from Nares, Bilateral    Final result Component Value   MRSA Target DNA Negative   SA Target DNA Positive            10/18/2023 0156 10/22/2023 0932 Blood Culture Arm, Left [[id number]]   Blood from Arm, Left    Preliminary result Component Value   Culture No growth after 4 days  [P]                10/18/2023 0143 10/22/2023 0932 Blood Culture Arm, Right [[id number]]   Blood from Arm, Right    Preliminary result Component Value   Culture No growth after 4 days  [P]                         RADIOLOGY:    Reviewed  Fingers XR, 2-3 views, left    Result Date: 10/18/2023  EXAM: XR FINGER LEFT G/E 2 VIEWS LOCATION: [facility] DATE: 10/18/2023 INDICATION: Finger trauma with infection. COMPARISON: None.     IMPRESSION: Negative for fracture or dislocation. No erosive changes. Soft tissue swelling about the PIP dorsally. No radiopaque foreign bodies.

## 2023-10-22 NOTE — PLAN OF CARE
Problem: Adult Inpatient Plan of Care  Goal: Optimal Comfort and Wellbeing  Intervention: Monitor Pain and Promote Comfort  Recent Flowsheet Documentation  Taken 10/22/2023 0824 by Gerhard Cartwright RN  Pain Management Interventions: medication (see MAR)     Problem: Adult Inpatient Plan of Care  Goal: Readiness for Transition of Care  10/22/2023 1430 by Gerhard aCrtwright RN  Outcome: Met      SUBJECTIVE:  Pt discharged today. Abx continue at home. Went over AVS with pt. Son helping with transport.      Goal Outcome Evaluation:      Plan of Care Reviewed With: patient    Overall Patient Progress: improving  Gerhard Swann RN

## 2023-10-22 NOTE — PLAN OF CARE
Goal Outcome Evaluation:                 Problem: Adult Inpatient Plan of Care  Goal: Absence of Hospital-Acquired Illness or Injury  Intervention: Identify and Manage Fall Risk  Recent Flowsheet Documentation  Taken 10/22/2023 0000 by Sixto Ray RN  Safety Promotion/Fall Prevention: safety round/check completed  Taken 10/21/2023 2000 by Sixto Ray RN  Safety Promotion/Fall Prevention: safety round/check completed  Intervention: Prevent Skin Injury  Recent Flowsheet Documentation  Taken 10/22/2023 0000 by Sixto Ray RN  Body Position: position changed independently  Taken 10/21/2023 2031 by Sixto Ray RN  Body Position: position changed independently  Taken 10/21/2023 2000 by Sixto Ray RN  Body Position: position changed independently      Patient soaked hand in soap and water then changed dressing, ambulated independently, complained of pain and received PRN dilaudid.

## 2023-10-23 LAB
BACTERIA BLD CULT: NO GROWTH
BACTERIA BLD CULT: NO GROWTH

## 2023-10-26 LAB — BACTERIA TISS BX CULT: NORMAL

## 2023-11-15 LAB — BACTERIA TISS BX CULT: NO GROWTH

## 2025-06-19 ENCOUNTER — HOSPITAL ENCOUNTER (EMERGENCY)
Facility: HOSPITAL | Age: 50
Discharge: HOME | End: 2025-06-19
Attending: STUDENT IN AN ORGANIZED HEALTH CARE EDUCATION/TRAINING PROGRAM | Admitting: STUDENT IN AN ORGANIZED HEALTH CARE EDUCATION/TRAINING PROGRAM

## 2025-06-19 VITALS
TEMPERATURE: 97.8 F | WEIGHT: 256 LBS | BODY MASS INDEX: 30.36 KG/M2 | DIASTOLIC BLOOD PRESSURE: 85 MMHG | SYSTOLIC BLOOD PRESSURE: 141 MMHG | RESPIRATION RATE: 22 BRPM | OXYGEN SATURATION: 96 % | HEART RATE: 69 BPM

## 2025-06-19 DIAGNOSIS — S41.112A LACERATION OF LEFT UPPER EXTREMITY, INITIAL ENCOUNTER: ICD-10-CM

## 2025-06-19 PROCEDURE — 250N000009 HC RX 250: Performed by: STUDENT IN AN ORGANIZED HEALTH CARE EDUCATION/TRAINING PROGRAM

## 2025-06-19 PROCEDURE — 12004 RPR S/N/AX/GEN/TRK7.6-12.5CM: CPT

## 2025-06-19 PROCEDURE — 99283 EMERGENCY DEPT VISIT LOW MDM: CPT | Mod: 25

## 2025-06-19 RX ORDER — GINSENG 100 MG
CAPSULE ORAL ONCE
Status: COMPLETED | OUTPATIENT
Start: 2025-06-19 | End: 2025-06-19

## 2025-06-19 RX ADMIN — BACITRACIN: 500 OINTMENT TOPICAL at 01:15

## 2025-06-19 ASSESSMENT — COLUMBIA-SUICIDE SEVERITY RATING SCALE - C-SSRS
1. IN THE PAST MONTH, HAVE YOU WISHED YOU WERE DEAD OR WISHED YOU COULD GO TO SLEEP AND NOT WAKE UP?: NO
2. HAVE YOU ACTUALLY HAD ANY THOUGHTS OF KILLING YOURSELF IN THE PAST MONTH?: NO
6. HAVE YOU EVER DONE ANYTHING, STARTED TO DO ANYTHING, OR PREPARED TO DO ANYTHING TO END YOUR LIFE?: NO

## 2025-06-19 ASSESSMENT — ACTIVITIES OF DAILY LIVING (ADL): ADLS_ACUITY_SCORE: 50

## 2025-06-19 NOTE — ED PROVIDER NOTES
Emergency Department Encounter         FINAL IMPRESSION:  Arm laceration            ED COURSE AND MEDICAL DECISION MAKING       ED Course as of 06/19/25 0100   Thu Jun 19, 2025   0032 Healthy 50-year-old here with left arm/proximal forearm laceration.  States he was on a ladder when he fell and hit his arm on a threshold/metal piece.  Has approximately 8 cm linear gaping laceration with exposure of the extensor tendons/fascia.  Tendon injury.  No arterial bleeding.                          Medical Decision Making      Discharge. No recommendations on prescription strength medication(s). See documentation for any additional details.    MIPS (CTPE, Dental pain, Lyman, Sinusitis, Asthma/COPD, Head Trauma): Not Applicable    SEPSIS: None            At the conclusion of the encounter I discussed the results of all the tests and the disposition. The questions were answered. The patient or family acknowledged understanding and was agreeable with the care plan.        MEDICATIONS GIVEN IN THE EMERGENCY DEPARTMENT:  Medications - No data to display    NEW PRESCRIPTIONS STARTED AT TODAY'S ED VISIT:  New Prescriptions    No medications on file       HPI     50-year-old male here with left arm laceration after a fall at work.  No head or neck injury.  No back pain.  No joint pain.           MEDICAL HISTORY     No past medical history on file.    Past Surgical History:   Procedure Laterality Date    INCISION AND DRAINAGE FINGER, COMBINED Left 10/18/2023    Procedure: INCISION AND DRAINAGE, LEFT MIDDLE FINGER PIP JOINT;  Surgeon: Cooper Spencer MD;  Location: [facility] Main OR       Social History     Tobacco Use    Smoking status: Never    Smokeless tobacco: Former       acetaminophen (TYLENOL) 325 MG tablet  allopurinol (ZYLOPRIM) 300 MG tablet  colchicine-probenecid (COLBENEMID) 0.5-500 MG tablet  fish oil-omega-3 fatty acids 1000 MG capsule  loratadine (CLARITIN) 10 MG tablet  omeprazole (PRILOSEC OTC) 20 MG EC  tablet  oxyCODONE (ROXICODONE) 5 MG tablet  senna-docusate (SENOKOT-S/PERICOLACE) 8.6-50 MG tablet  TURMERIC PO  ZINC OXIDE PO            PHYSICAL EXAM     BP (!) 156/105   Pulse 79   Temp 97.8  F (36.6  C) (Oral)   Resp 22   Wt 116.1 kg (256 lb)   SpO2 98%   BMI 30.36 kg/m        PHYSICAL EXAM:     General: Patient appears well, nontoxic, comfortable  HEENT: Moist mucous membranes,  No head trauma.    Cardiovascular: Normal rate, normal rhythm, no extremity edema.  No appreciable murmur.  Musculoskeletal: 8 cm linear laceration with exposure of forearm fascia.  No arterial injury.  No ligamentous injury.  No pain with manipulation of patient's bony prominences.  Neurological: Alert and oriented, grossly neurologically intact.  Psychological: Normal affect and mood.  Integument: No rashes appreciated          RESULTS       Labs Ordered and Resulted from Time of ED Arrival to Time of ED Departure - No data to display    No orders to display         PROCEDURES:  Procedures:  [facility]    -Laceration Repair    Date/Time: 6/19/2025 1:01 AM    Performed by: Andrew Kenney DO  Authorized by: Andrew Kenney DO    Emergent condition/consent implied      ANESTHESIA (see MAR for exact dosages):     Anesthesia method:  Local infiltration    Local anesthetic:  Lidocaine 1% WITH epi  LACERATION DETAILS     Location:  Shoulder/arm    Shoulder/arm location:  L lower arm    Length (cm):  8    REPAIR TYPE:     Repair type:  Simple    EXPLORATION:     Hemostasis achieved with:  Epinephrine    Wound extent: fascia not violated, no foreign body and no signs of injury      TREATMENT:     Area cleansed with:  Saline    Amount of cleaning:  Standard    Irrigation solution:  Sterile saline    Irrigation volume:  1000    Irrigation method:  Pressure wash    Visualized foreign bodies/material removed: no      SKIN REPAIR     Repair method:  Sutures    Suture size:  4-0    Suture material:  Nylon     Suture technique:  Simple interrupted    Number of sutures:  10    APPROXIMATION     Approximation:  Close    POST-PROCEDURE DETAILS     Dressing:  Antibiotic ointment      PROCEDURE    Patient Tolerance:  Patient tolerated the procedure well with no immediate complications           Andrew Kenney DO  Emergency Medicine  [facility] EMERGENCY DEPARTMENT       Andrew Kenney DO  06/19/25 0102

## 2025-06-19 NOTE — DISCHARGE INSTRUCTIONS
Please wash your wound at least twice daily with warm water and soap.  Turn for signs of infection.  You will need to have your sutures removed in 7 to 10 days.

## (undated) DEVICE — ESU CORD BIPOLAR 12' E0512

## (undated) DEVICE — SOL WATER IRRIG 1000ML BOTTLE 2F7114

## (undated) DEVICE — SU ETHILON 5-0 P-3 18" BLACK 698G

## (undated) DEVICE — Device

## (undated) DEVICE — GLOVE BIOGEL PI ULTRATOUCH G SZ 8.0 42180

## (undated) DEVICE — PREP CHLORAPREP 26ML TINTED HI-LITE ORANGE 930815

## (undated) DEVICE — SYR BULB IRRIG DOVER 60 ML LATEX FREE 67000

## (undated) DEVICE — GLOVE BIOGEL PI ULTRATOUCH G SZ 7.0 42170

## (undated) DEVICE — CUSTOM PACK UPPER EXTREMITY SOP5BUEHEC

## (undated) DEVICE — DRSG ADAPTIC 3X3" 6112

## (undated) DEVICE — TOURNIQUET TOURNI-COT FINGER BLUE XL TXL

## (undated) DEVICE — SOL NACL 0.9% IRRIG 1000ML BOTTLE 2F7124

## (undated) DEVICE — NEEDLE HYPO MAGELLAN SAFETY 22GA 1 1/2IN 8881850215

## (undated) DEVICE — BNDG KLING 2" 2231

## (undated) DEVICE — SUCTION MANIFOLD NEPTUNE 2 SYS 1 PORT 702-025-000

## (undated) DEVICE — DRSG GAUZE 4X4" 3033

## (undated) DEVICE — SYR 10ML LL W/O NDL 302995

## (undated) RX ORDER — ONDANSETRON 2 MG/ML
INJECTION INTRAMUSCULAR; INTRAVENOUS
Status: DISPENSED
Start: 2023-10-18

## (undated) RX ORDER — GINSENG 100 MG
CAPSULE ORAL
Status: DISPENSED
Start: 2023-10-18

## (undated) RX ORDER — BUPIVACAINE HYDROCHLORIDE 5 MG/ML
INJECTION, SOLUTION EPIDURAL; INTRACAUDAL
Status: DISPENSED
Start: 2023-10-18

## (undated) RX ORDER — FENTANYL CITRATE 50 UG/ML
INJECTION, SOLUTION INTRAMUSCULAR; INTRAVENOUS
Status: DISPENSED
Start: 2023-10-18

## (undated) RX ORDER — LIDOCAINE HYDROCHLORIDE AND EPINEPHRINE 10; 10 MG/ML; UG/ML
INJECTION, SOLUTION INFILTRATION; PERINEURAL
Status: DISPENSED
Start: 2023-10-18